# Patient Record
Sex: FEMALE | Race: ASIAN | NOT HISPANIC OR LATINO | Employment: PART TIME
[De-identification: names, ages, dates, MRNs, and addresses within clinical notes are randomized per-mention and may not be internally consistent; named-entity substitution may affect disease eponyms.]

---

## 2017-02-09 ENCOUNTER — RECORDS - HEALTHEAST (OUTPATIENT)
Dept: ADMINISTRATIVE | Facility: OTHER | Age: 42
End: 2017-02-09

## 2018-03-14 ENCOUNTER — COMMUNICATION - HEALTHEAST (OUTPATIENT)
Dept: TELEHEALTH | Facility: CLINIC | Age: 43
End: 2018-03-14

## 2018-03-14 ENCOUNTER — OFFICE VISIT - HEALTHEAST (OUTPATIENT)
Dept: FAMILY MEDICINE | Facility: CLINIC | Age: 43
End: 2018-03-14

## 2018-03-14 DIAGNOSIS — Z00.00 HEALTH CARE MAINTENANCE: ICD-10-CM

## 2018-03-14 DIAGNOSIS — R05.9 COUGH: ICD-10-CM

## 2018-03-14 LAB
ALBUMIN SERPL-MCNC: 3.3 G/DL (ref 3.5–5)
ALP SERPL-CCNC: 85 U/L (ref 45–120)
ALT SERPL W P-5'-P-CCNC: 11 U/L (ref 0–45)
ANION GAP SERPL CALCULATED.3IONS-SCNC: 8 MMOL/L (ref 5–18)
AST SERPL W P-5'-P-CCNC: 9 U/L (ref 0–40)
BILIRUB SERPL-MCNC: 0.3 MG/DL (ref 0–1)
BUN SERPL-MCNC: 10 MG/DL (ref 8–22)
CALCIUM SERPL-MCNC: 8.5 MG/DL (ref 8.5–10.5)
CHLORIDE BLD-SCNC: 107 MMOL/L (ref 98–107)
CHOLEST SERPL-MCNC: 182 MG/DL
CO2 SERPL-SCNC: 24 MMOL/L (ref 22–31)
CREAT SERPL-MCNC: 0.69 MG/DL (ref 0.6–1.1)
ERYTHROCYTE [DISTWIDTH] IN BLOOD BY AUTOMATED COUNT: 12.4 % (ref 11–14.5)
FASTING STATUS PATIENT QL REPORTED: YES
GFR SERPL CREATININE-BSD FRML MDRD: >60 ML/MIN/1.73M2
GLUCOSE BLD-MCNC: 94 MG/DL (ref 70–125)
HCT VFR BLD AUTO: 34.3 % (ref 35–47)
HDLC SERPL-MCNC: 49 MG/DL
HGB BLD-MCNC: 11.7 G/DL (ref 12–16)
LDLC SERPL CALC-MCNC: 122 MG/DL
MCH RBC QN AUTO: 28.6 PG (ref 27–34)
MCHC RBC AUTO-ENTMCNC: 34 G/DL (ref 32–36)
MCV RBC AUTO: 84 FL (ref 80–100)
PLATELET # BLD AUTO: 293 THOU/UL (ref 140–440)
PMV BLD AUTO: 8.7 FL (ref 7–10)
POTASSIUM BLD-SCNC: 4.4 MMOL/L (ref 3.5–5)
PROT SERPL-MCNC: 6.5 G/DL (ref 6–8)
RBC # BLD AUTO: 4.08 MILL/UL (ref 3.8–5.4)
SODIUM SERPL-SCNC: 139 MMOL/L (ref 136–145)
TRIGL SERPL-MCNC: 57 MG/DL
WBC: 4.7 THOU/UL (ref 4–11)

## 2018-03-14 ASSESSMENT — MIFFLIN-ST. JEOR: SCORE: 1288.64

## 2018-03-15 ENCOUNTER — HOSPITAL ENCOUNTER (OUTPATIENT)
Dept: MAMMOGRAPHY | Facility: CLINIC | Age: 43
Discharge: HOME | End: 2018-03-15

## 2018-03-15 DIAGNOSIS — Z00.00 HEALTH CARE MAINTENANCE: ICD-10-CM

## 2018-03-15 LAB — 25(OH)D3 SERPL-MCNC: 6.5 NG/ML (ref 30–80)

## 2018-03-16 ENCOUNTER — COMMUNICATION - HEALTHEAST (OUTPATIENT)
Dept: MAMMOGRAPHY | Facility: CLINIC | Age: 43
End: 2018-03-16

## 2018-03-20 ENCOUNTER — OFFICE VISIT - HEALTHEAST (OUTPATIENT)
Dept: FAMILY MEDICINE | Facility: CLINIC | Age: 43
End: 2018-03-20

## 2018-03-20 DIAGNOSIS — R09.82 PND (POST-NASAL DRIP): ICD-10-CM

## 2018-03-20 DIAGNOSIS — R05.3 PERSISTENT COUGH FOR 3 WEEKS OR LONGER: ICD-10-CM

## 2018-03-20 DIAGNOSIS — Z80.41 FAMILY HISTORY OF OVARIAN CANCER: ICD-10-CM

## 2018-04-09 ENCOUNTER — HOSPITAL ENCOUNTER (OUTPATIENT)
Dept: MAMMOGRAPHY | Facility: CLINIC | Age: 43
Discharge: HOME | End: 2018-04-09

## 2018-04-09 ENCOUNTER — HOSPITAL ENCOUNTER (OUTPATIENT)
Dept: ULTRASOUND IMAGING | Facility: CLINIC | Age: 43
Discharge: HOME | End: 2018-04-09

## 2018-04-09 DIAGNOSIS — N64.89 BREAST ASYMMETRY: ICD-10-CM

## 2018-04-17 ENCOUNTER — HOSPITAL ENCOUNTER (OUTPATIENT)
Dept: ULTRASOUND IMAGING | Facility: CLINIC | Age: 43
Discharge: HOME | End: 2018-04-17

## 2018-04-17 DIAGNOSIS — N64.89 BREAST ASYMMETRY: ICD-10-CM

## 2018-09-17 ENCOUNTER — OFFICE VISIT - HEALTHEAST (OUTPATIENT)
Dept: FAMILY MEDICINE | Facility: CLINIC | Age: 43
End: 2018-09-17

## 2018-09-17 DIAGNOSIS — B37.31 YEAST VAGINITIS: ICD-10-CM

## 2018-09-17 DIAGNOSIS — N89.8 VAGINAL DISCHARGE: ICD-10-CM

## 2018-09-17 DIAGNOSIS — N76.0 BACTERIAL VAGINOSIS: ICD-10-CM

## 2018-09-17 DIAGNOSIS — B96.89 BACTERIAL VAGINOSIS: ICD-10-CM

## 2018-09-17 LAB
CLUE CELLS: ABNORMAL
TRICHOMONAS, WET PREP: ABNORMAL
YEAST, WET PREP: ABNORMAL

## 2018-11-08 ENCOUNTER — OFFICE VISIT - HEALTHEAST (OUTPATIENT)
Dept: FAMILY MEDICINE | Facility: CLINIC | Age: 43
End: 2018-11-08

## 2018-11-08 ENCOUNTER — COMMUNICATION - HEALTHEAST (OUTPATIENT)
Dept: SCHEDULING | Facility: CLINIC | Age: 43
End: 2018-11-08

## 2018-11-08 DIAGNOSIS — B37.31 YEAST VAGINITIS: ICD-10-CM

## 2018-11-08 DIAGNOSIS — N89.8 VAGINAL DISCHARGE: ICD-10-CM

## 2018-11-08 LAB
CLUE CELLS: ABNORMAL
TRICHOMONAS, WET PREP: ABNORMAL
YEAST, WET PREP: ABNORMAL

## 2018-11-08 ASSESSMENT — MIFFLIN-ST. JEOR: SCORE: 1324.65

## 2019-04-19 ENCOUNTER — COMMUNICATION - HEALTHEAST (OUTPATIENT)
Dept: TELEHEALTH | Facility: CLINIC | Age: 44
End: 2019-04-19

## 2019-04-19 ENCOUNTER — HOSPITAL ENCOUNTER (OUTPATIENT)
Dept: MAMMOGRAPHY | Facility: CLINIC | Age: 44
Discharge: HOME | End: 2019-04-19
Attending: FAMILY MEDICINE

## 2019-04-19 ENCOUNTER — OFFICE VISIT - HEALTHEAST (OUTPATIENT)
Dept: FAMILY MEDICINE | Facility: CLINIC | Age: 44
End: 2019-04-19

## 2019-04-19 DIAGNOSIS — N89.8 VAGINAL DISCHARGE: ICD-10-CM

## 2019-04-19 DIAGNOSIS — Z12.4 SCREENING FOR MALIGNANT NEOPLASM OF CERVIX: ICD-10-CM

## 2019-04-19 DIAGNOSIS — Z12.31 VISIT FOR SCREENING MAMMOGRAM: ICD-10-CM

## 2019-04-19 LAB
CLUE CELLS: NORMAL
TRICHOMONAS, WET PREP: NORMAL
YEAST, WET PREP: NORMAL

## 2019-04-22 ENCOUNTER — COMMUNICATION - HEALTHEAST (OUTPATIENT)
Dept: FAMILY MEDICINE | Facility: CLINIC | Age: 44
End: 2019-04-22

## 2019-04-22 LAB
C TRACH DNA SPEC QL PROBE+SIG AMP: NEGATIVE
N GONORRHOEA DNA SPEC QL NAA+PROBE: NEGATIVE

## 2019-04-23 LAB
HPV SOURCE: NORMAL
HUMAN PAPILLOMA VIRUS 16 DNA: NEGATIVE
HUMAN PAPILLOMA VIRUS 18 DNA: NEGATIVE
HUMAN PAPILLOMA VIRUS FINAL DIAGNOSIS: NORMAL
HUMAN PAPILLOMA VIRUS OTHER HR: NEGATIVE
SPECIMEN DESCRIPTION: NORMAL

## 2019-04-26 LAB
BKR LAB AP ABNORMAL BLEEDING: NO
BKR LAB AP BIRTH CONTROL/HORMONES: NORMAL
BKR LAB AP CERVICAL APPEARANCE: NORMAL
BKR LAB AP GYN ADEQUACY: NORMAL
BKR LAB AP GYN INTERPRETATION: NORMAL
BKR LAB AP GYN OTHER FINDINGS: NORMAL
BKR LAB AP HPV REFLEX: NORMAL
BKR LAB AP LMP: NORMAL
BKR LAB AP PATIENT STATUS: NORMAL
BKR LAB AP PREVIOUS ABNORMAL: NORMAL
BKR LAB AP PREVIOUS NORMAL: NORMAL
HIGH RISK?: NO
PATH REPORT.COMMENTS IMP SPEC: NORMAL
RESULT FLAG (HE HISTORICAL CONVERSION): NORMAL

## 2019-04-28 ENCOUNTER — AMBULATORY - HEALTHEAST (OUTPATIENT)
Dept: FAMILY MEDICINE | Facility: CLINIC | Age: 44
End: 2019-04-28

## 2019-04-28 ENCOUNTER — COMMUNICATION - HEALTHEAST (OUTPATIENT)
Dept: FAMILY MEDICINE | Facility: CLINIC | Age: 44
End: 2019-04-28

## 2019-04-28 DIAGNOSIS — B37.31 YEAST VAGINITIS: ICD-10-CM

## 2019-05-20 ENCOUNTER — COMMUNICATION - HEALTHEAST (OUTPATIENT)
Dept: TELEHEALTH | Facility: CLINIC | Age: 44
End: 2019-05-20

## 2019-05-20 ENCOUNTER — OFFICE VISIT - HEALTHEAST (OUTPATIENT)
Dept: FAMILY MEDICINE | Facility: CLINIC | Age: 44
End: 2019-05-20

## 2019-05-20 DIAGNOSIS — L30.9 DERMATITIS: ICD-10-CM

## 2019-05-20 DIAGNOSIS — B37.31 YEAST VAGINITIS: ICD-10-CM

## 2019-05-29 ENCOUNTER — OFFICE VISIT - HEALTHEAST (OUTPATIENT)
Dept: FAMILY MEDICINE | Facility: CLINIC | Age: 44
End: 2019-05-29

## 2019-05-29 DIAGNOSIS — Z13.220 LIPID SCREENING: ICD-10-CM

## 2019-05-29 DIAGNOSIS — E55.9 VITAMIN D DEFICIENCY: ICD-10-CM

## 2019-05-29 DIAGNOSIS — Z00.00 ROUTINE GENERAL MEDICAL EXAMINATION AT A HEALTH CARE FACILITY: ICD-10-CM

## 2019-05-29 DIAGNOSIS — B37.31 RECURRENT CANDIDIASIS OF VAGINA: ICD-10-CM

## 2019-05-29 DIAGNOSIS — Z23 IMMUNIZATION DUE: ICD-10-CM

## 2019-05-29 DIAGNOSIS — L30.9 DERMATITIS: ICD-10-CM

## 2019-05-29 LAB
25(OH)D3 SERPL-MCNC: 5.3 NG/ML (ref 30–80)
25(OH)D3 SERPL-MCNC: 5.3 NG/ML (ref 30–80)
ALBUMIN SERPL-MCNC: 3.4 G/DL (ref 3.5–5)
ALP SERPL-CCNC: 85 U/L (ref 45–120)
ALT SERPL W P-5'-P-CCNC: 12 U/L (ref 0–45)
ANION GAP SERPL CALCULATED.3IONS-SCNC: 6 MMOL/L (ref 5–18)
AST SERPL W P-5'-P-CCNC: 9 U/L (ref 0–40)
BASOPHILS # BLD AUTO: 0 THOU/UL (ref 0–0.2)
BASOPHILS NFR BLD AUTO: 0 % (ref 0–2)
BILIRUB SERPL-MCNC: 0.3 MG/DL (ref 0–1)
BUN SERPL-MCNC: 9 MG/DL (ref 8–22)
CALCIUM SERPL-MCNC: 8.8 MG/DL (ref 8.5–10.5)
CHLORIDE BLD-SCNC: 109 MMOL/L (ref 98–107)
CHOLEST SERPL-MCNC: 181 MG/DL
CO2 SERPL-SCNC: 25 MMOL/L (ref 22–31)
CREAT SERPL-MCNC: 0.68 MG/DL (ref 0.6–1.1)
EOSINOPHIL # BLD AUTO: 0.2 THOU/UL (ref 0–0.4)
EOSINOPHIL NFR BLD AUTO: 4 % (ref 0–6)
ERYTHROCYTE [DISTWIDTH] IN BLOOD BY AUTOMATED COUNT: 13.4 % (ref 11–14.5)
ERYTHROCYTE [SEDIMENTATION RATE] IN BLOOD BY WESTERGREN METHOD: 25 MM/HR (ref 0–20)
FASTING STATUS PATIENT QL REPORTED: YES
GFR SERPL CREATININE-BSD FRML MDRD: >60 ML/MIN/1.73M2
GLUCOSE BLD-MCNC: 90 MG/DL (ref 70–125)
HBA1C MFR BLD: 5.5 % (ref 3.5–6)
HCT VFR BLD AUTO: 34.9 % (ref 35–47)
HDLC SERPL-MCNC: 56 MG/DL
HGB BLD-MCNC: 11.2 G/DL (ref 12–16)
LDLC SERPL CALC-MCNC: 112 MG/DL
LYMPHOCYTES # BLD AUTO: 1.4 THOU/UL (ref 0.8–4.4)
LYMPHOCYTES NFR BLD AUTO: 30 % (ref 20–40)
MCH RBC QN AUTO: 28.1 PG (ref 27–34)
MCHC RBC AUTO-ENTMCNC: 32.1 G/DL (ref 32–36)
MCV RBC AUTO: 88 FL (ref 80–100)
MONOCYTES # BLD AUTO: 0.3 THOU/UL (ref 0–0.9)
MONOCYTES NFR BLD AUTO: 6 % (ref 2–10)
NEUTROPHILS # BLD AUTO: 2.8 THOU/UL (ref 2–7.7)
NEUTROPHILS NFR BLD AUTO: 60 % (ref 50–70)
PLATELET # BLD AUTO: 197 THOU/UL (ref 140–440)
PMV BLD AUTO: 12.5 FL (ref 8.5–12.5)
POTASSIUM BLD-SCNC: 4.5 MMOL/L (ref 3.5–5)
PROT SERPL-MCNC: 6.3 G/DL (ref 6–8)
RBC # BLD AUTO: 3.99 MILL/UL (ref 3.8–5.4)
SODIUM SERPL-SCNC: 140 MMOL/L (ref 136–145)
TRIGL SERPL-MCNC: 65 MG/DL
TSH SERPL DL<=0.005 MIU/L-ACNC: 0.72 UIU/ML (ref 0.3–5)
WBC: 4.6 THOU/UL (ref 4–11)

## 2019-05-29 ASSESSMENT — MIFFLIN-ST. JEOR: SCORE: 1325.39

## 2019-07-01 ENCOUNTER — RECORDS - HEALTHEAST (OUTPATIENT)
Dept: ADMINISTRATIVE | Facility: OTHER | Age: 44
End: 2019-07-01

## 2019-07-26 ENCOUNTER — COMMUNICATION - HEALTHEAST (OUTPATIENT)
Dept: FAMILY MEDICINE | Facility: CLINIC | Age: 44
End: 2019-07-26

## 2019-07-26 DIAGNOSIS — B37.31 RECURRENT CANDIDIASIS OF VAGINA: ICD-10-CM

## 2019-08-09 ENCOUNTER — RECORDS - HEALTHEAST (OUTPATIENT)
Dept: ADMINISTRATIVE | Facility: OTHER | Age: 44
End: 2019-08-09

## 2019-08-09 LAB
CHLAMYDIA_EXT- HISTORICAL: NEGATIVE
SPECIMEN DESCRIPTION_EXT (HISTORICAL CONVERSION): NORMAL

## 2019-08-23 ENCOUNTER — RECORDS - HEALTHEAST (OUTPATIENT)
Dept: HEALTH INFORMATION MANAGEMENT | Facility: CLINIC | Age: 44
End: 2019-08-23

## 2019-09-03 ENCOUNTER — RECORDS - HEALTHEAST (OUTPATIENT)
Dept: ADMINISTRATIVE | Facility: OTHER | Age: 44
End: 2019-09-03

## 2019-12-30 ENCOUNTER — COMMUNICATION - HEALTHEAST (OUTPATIENT)
Dept: FAMILY MEDICINE | Facility: CLINIC | Age: 44
End: 2019-12-30

## 2020-01-06 ENCOUNTER — OFFICE VISIT - HEALTHEAST (OUTPATIENT)
Dept: FAMILY MEDICINE | Facility: CLINIC | Age: 45
End: 2020-01-06

## 2020-01-06 DIAGNOSIS — Z79.899 HIGH RISK MEDICATION USE: ICD-10-CM

## 2020-01-06 DIAGNOSIS — N76.0 RECURRENT VAGINITIS: ICD-10-CM

## 2020-01-06 DIAGNOSIS — N89.8 VAGINAL ODOR: ICD-10-CM

## 2020-01-06 DIAGNOSIS — B37.31 RECURRENT CANDIDIASIS OF VAGINA: ICD-10-CM

## 2020-01-06 LAB
CLUE CELLS: ABNORMAL
FASTING STATUS PATIENT QL REPORTED: NO
GLUCOSE BLD-MCNC: 84 MG/DL (ref 74–125)
TRICHOMONAS, WET PREP: ABNORMAL
YEAST, WET PREP: ABNORMAL

## 2020-01-07 LAB — ALT SERPL W P-5'-P-CCNC: 12 U/L (ref 0–45)

## 2020-02-18 ENCOUNTER — OFFICE VISIT - HEALTHEAST (OUTPATIENT)
Dept: FAMILY MEDICINE | Facility: CLINIC | Age: 45
End: 2020-02-18

## 2020-02-18 ENCOUNTER — COMMUNICATION - HEALTHEAST (OUTPATIENT)
Dept: TELEHEALTH | Facility: CLINIC | Age: 45
End: 2020-02-18

## 2020-02-18 DIAGNOSIS — N76.0 RECURRENT VAGINITIS: ICD-10-CM

## 2020-02-18 DIAGNOSIS — N94.6 DYSMENORRHEA: ICD-10-CM

## 2020-03-05 ENCOUNTER — COMMUNICATION - HEALTHEAST (OUTPATIENT)
Dept: SCHEDULING | Facility: CLINIC | Age: 45
End: 2020-03-05

## 2020-03-05 ENCOUNTER — OFFICE VISIT - HEALTHEAST (OUTPATIENT)
Dept: FAMILY MEDICINE | Facility: CLINIC | Age: 45
End: 2020-03-05

## 2020-03-05 DIAGNOSIS — J11.1 INFLUENZA-LIKE SYNDROME: ICD-10-CM

## 2020-08-05 ENCOUNTER — COMMUNICATION - HEALTHEAST (OUTPATIENT)
Dept: TELEHEALTH | Facility: CLINIC | Age: 45
End: 2020-08-05

## 2020-08-05 ENCOUNTER — OFFICE VISIT - HEALTHEAST (OUTPATIENT)
Dept: FAMILY MEDICINE | Facility: CLINIC | Age: 45
End: 2020-08-05

## 2020-08-05 DIAGNOSIS — Z13.1 SCREENING FOR DIABETES MELLITUS: ICD-10-CM

## 2020-08-05 DIAGNOSIS — Z00.00 ROUTINE GENERAL MEDICAL EXAMINATION AT A HEALTH CARE FACILITY: ICD-10-CM

## 2020-08-05 DIAGNOSIS — Z78.9 VEGETARIAN DIET: ICD-10-CM

## 2020-08-05 DIAGNOSIS — E55.9 VITAMIN D DEFICIENCY: ICD-10-CM

## 2020-08-05 DIAGNOSIS — Z13.220 LIPID SCREENING: ICD-10-CM

## 2020-08-05 DIAGNOSIS — E61.1 IRON DEFICIENCY: ICD-10-CM

## 2020-08-05 DIAGNOSIS — Z12.31 ENCOUNTER FOR SCREENING MAMMOGRAM FOR BREAST CANCER: ICD-10-CM

## 2020-08-05 LAB
ALBUMIN SERPL-MCNC: 3.7 G/DL (ref 3.5–5)
ALP SERPL-CCNC: 97 U/L (ref 45–120)
ALT SERPL W P-5'-P-CCNC: 17 U/L (ref 0–45)
ANION GAP SERPL CALCULATED.3IONS-SCNC: 7 MMOL/L (ref 5–18)
AST SERPL W P-5'-P-CCNC: 11 U/L (ref 0–40)
BILIRUB SERPL-MCNC: 0.6 MG/DL (ref 0–1)
BUN SERPL-MCNC: 10 MG/DL (ref 8–22)
CALCIUM SERPL-MCNC: 9.3 MG/DL (ref 8.5–10.5)
CHLORIDE BLD-SCNC: 106 MMOL/L (ref 98–107)
CHOLEST SERPL-MCNC: 176 MG/DL
CO2 SERPL-SCNC: 25 MMOL/L (ref 22–31)
CREAT SERPL-MCNC: 0.74 MG/DL (ref 0.6–1.1)
ERYTHROCYTE [DISTWIDTH] IN BLOOD BY AUTOMATED COUNT: 13.4 % (ref 11–14.5)
FASTING STATUS PATIENT QL REPORTED: YES
FERRITIN SERPL-MCNC: 14 NG/ML (ref 10–130)
GFR SERPL CREATININE-BSD FRML MDRD: >60 ML/MIN/1.73M2
GLUCOSE BLD-MCNC: 92 MG/DL (ref 70–125)
HCT VFR BLD AUTO: 35.6 % (ref 35–47)
HDLC SERPL-MCNC: 58 MG/DL
HGB BLD-MCNC: 12 G/DL (ref 12–16)
IRON SATN MFR SERPL: 38 % (ref 20–50)
IRON SERPL-MCNC: 149 UG/DL (ref 42–175)
LDLC SERPL CALC-MCNC: 105 MG/DL
MCH RBC QN AUTO: 28 PG (ref 27–34)
MCHC RBC AUTO-ENTMCNC: 33.8 G/DL (ref 32–36)
MCV RBC AUTO: 83 FL (ref 80–100)
PLATELET # BLD AUTO: 194 THOU/UL (ref 140–440)
PMV BLD AUTO: 9.3 FL (ref 7–10)
POTASSIUM BLD-SCNC: 4.8 MMOL/L (ref 3.5–5)
PROT SERPL-MCNC: 6.7 G/DL (ref 6–8)
RBC # BLD AUTO: 4.29 MILL/UL (ref 3.8–5.4)
SODIUM SERPL-SCNC: 138 MMOL/L (ref 136–145)
TIBC SERPL-MCNC: 390 UG/DL (ref 313–563)
TRANSFERRIN SERPL-MCNC: 312 MG/DL (ref 212–360)
TRIGL SERPL-MCNC: 66 MG/DL
VIT B12 SERPL-MCNC: 273 PG/ML (ref 213–816)
WBC: 5.3 THOU/UL (ref 4–11)

## 2020-08-05 ASSESSMENT — MIFFLIN-ST. JEOR: SCORE: 1314.05

## 2020-08-06 LAB
25(OH)D3 SERPL-MCNC: 23.5 NG/ML (ref 30–80)
25(OH)D3 SERPL-MCNC: 23.5 NG/ML (ref 30–80)

## 2020-08-13 ENCOUNTER — COMMUNICATION - HEALTHEAST (OUTPATIENT)
Dept: HEALTH INFORMATION MANAGEMENT | Facility: CLINIC | Age: 45
End: 2020-08-13

## 2020-10-08 ENCOUNTER — HOSPITAL ENCOUNTER (OUTPATIENT)
Dept: MAMMOGRAPHY | Facility: CLINIC | Age: 45
Discharge: HOME | End: 2020-10-08
Attending: FAMILY MEDICINE

## 2020-10-08 DIAGNOSIS — Z12.31 ENCOUNTER FOR SCREENING MAMMOGRAM FOR BREAST CANCER: ICD-10-CM

## 2021-05-28 NOTE — TELEPHONE ENCOUNTER
----- Message from Cynthia Whittington MD sent at 4/19/2019  6:00 PM CDT -----  Wet prep is negative for yeast or bacterial infection. Recommend ongoing symptomatic management for now. Pap smear will be back in another 2-3 wks.

## 2021-05-28 NOTE — TELEPHONE ENCOUNTER
Patient Returning Call  Reason for call:  Returning phone call  Information relayed to patient: Below message relayed to patient.  Patient has additional questions:  No  If YES, what are your questions/concerns:  No additional questions at this time.  Okay to leave a detailed message?: No call back needed

## 2021-05-28 NOTE — PROGRESS NOTES
OV   4/19/2019  Assessment:         1. Vaginal discharge  Wet Prep, Vaginal    Chlamydia trachomatis & Neisseria gonorrhoeae, Amplified Detection   2. Screening for malignant neoplasm of cervix  Gynecologic Cytology (PAP Smear)    HPV High Risk DNA Cervical           Plan:         We reviewed the potential etiologies for her vaginal symptoms and labs were sent as noted above. We discussed intermittent use of metronidazole and diflucan and other preventive measures. We did check a pap today. She will call or return to clinic with any ongoing or worsening symptoms.        Subjective:             Khadra Cummings is a 43 y.o. female who presents for evaluation of recurrent vaginal discharge, itching and burning. Symptoms have been present for several mos. Vaginal symptoms: intermittent odor. She does note that her  will get occasional burning symptoms as well. She denies lesions, pain and urinary symptoms of dysuria and urinary frequency. Associated symptoms: none.       Contraception: none. Sexually transmitted infection risk: very low risk of STD exposure. Menstrual flow: regular every 28-30 days. Last pap was in [place] 2-3 yrs ago. Would like to have       The following portions of the patient's history were reviewed and updated as appropriate: allergies, current medications and problem list    Review of Systems  A 12 point comprehensive review of systems was negative except as noted.       Objective:         Vitals:    04/19/19 0903   BP: 92/70   Pulse: 73   SpO2: 99%   Weight: 151 lb 6 oz (68.7 kg)   LMP: 04/06/2019      Physical Exam:  General: Alert, cooperative, no distress  Head: Normocephalic, without obvious abnormality, atraumatic  Eyes:  conjunctiva/corneas clear, EOM's intact  Throat: Lips, mucosa, and tongue normal; teeth and gums normal  Neck: Supple, symmetrical, trachea midline, no  Lungs: Clear to auscultation bilaterally, respirations unlabored  Heart: Regular rate and rhythm,  no murmur, rub,  or gallop,   Abdomen: Soft, non-tender, no masses, no organomegaly  Pelvic:  external genitalia normal, mild vaginal discharge, cervix normal in appearance,   no cervical motion tenderness  Extremities: Extremities normal, atraumatic, no cyanosis or edema  Skin: Skin color, texture, turgor normal, no rashes or lesions  Neurologic: Normal         Results for orders placed or performed in visit on 04/19/19   Wet Prep, Vaginal   Result Value Ref Range    Yeast Result No yeast seen No yeast seen    Trichomonas No Trichomonas seen No Trichomonas seen    Clue Cells, Wet Prep No Clue cells seen No Clue cells seen   Chlamydia trachomatis & Neisseria gonorrhoeae, Amplified Detection   Result Value Ref Range    Chlamydia trachomatis, Amplified Detection Negative Negative    Neisseria gonorrhoeae, Amplified Detection Negative Negative   Gynecologic Cytology (PAP Smear)   Result Value Ref Range    Case Report       Gynecologic Cytology Report                       Case: [id number]                                   Authorizing Provider:  Cynthia Whittington MD Collected:           04/19/2019 0941              Ordering Location:     [facility]       Received:            04/19/2019 0941                                     Family Medicine/OB                                                           First Screen:          Shea Pulido, CT                                                                               (ASCP)                                                                       Specimen:    SUREPATH PAP, SCREENING, Endocervical/cervical                                             Interpretation  Negative for squamous intraepithelial lesion or malignancy.      Negative for squamous intraepithelial lesion or malignancy    Result Flag Normal Normal    Other Findings       Fungal organisms morphologically consistent with Candida spp    Specimen Adequacy       Satisfactory for evaluation,  endocervical/transformation zone component present    HPV Reflex? Yes regardless of result     HIGH RISK No     LMP/Menopause Date 4/6/19     Abnormal Bleeding No     Pt Status n/a     Birth Control/Hormones None     Previous Normal/Date ? 2016     Prev Abn Date/Dx none     Cervical Appearance Normal    HPV High Risk DNA Cervical   Result Value Ref Range    HPV Source [facility]     HPV16 DNA Negative NEG    HPV18 DNA Negative NEG    Other HR HPV Negative NEG    Final Diagnosis SEE NOTES     Specimen Description Cervical Cells

## 2021-05-29 NOTE — PROGRESS NOTES
OV   5/20/2019  Assessment & Plan:      1. Dermatitis  clobetasol (TEMOVATE) 0.05 % cream   2. Yeast vaginitis  fluconazole (DIFLUCAN) 150 MG tablet         We reviewed the potential etiologies for her rash symptoms and we will cover with oral diflucan and topical clobetasol cream for the rash. We reviewed indications for re-evaluation and she will call or return to clinic with any additional problems or concerns.    Subjective:               Khadra Cummings is a 43 y.o. female who presents for evaluation of a rash/lesion involving the left lower leg. It started a few months ago, but she has had a similar rash on her right shin in the past. She denies any lesions on the scalp or hands or feet. Lesions are  flat, pink, and scaly. Rash has not changed over time. Rash is pruritic. Associated symptoms: none. Patient denies: arthralgia, decrease in energy level, fever and myalgia.        Patient has not had contacts with similar rash. Patient has not had new exposures (soaps, lotions, laundry detergents, foods, medications, plants, insects or animals). No known infectious exposures.       She also notes some recent vaginal itching and mild discharge.     The following portions of the patient's history were reviewed and updated as appropriate: allergies, current medications, past family history, past medical history, past social history, past surgical history and problem list.    Review of Systems  Pertinent items are noted in HPI.      Objective:      /64 (Patient Position: Sitting, Cuff Size: Adult Regular)   Pulse 72   Wt 150 lb 4 oz (68.2 kg)   SpO2 99%   BMI 25.59 kg/m           General:  alert, appears stated age and cooperative   Skin:  Erythema and scaling noted on anterior left lower leg. None noted on the right.

## 2021-05-29 NOTE — PROGRESS NOTES
Assessment:     1. Routine general medical examination at a health care facility    2. Recurrent candidiasis of vagina    3. Vitamin D deficiency    4. Dermatitis    5. Lipid screening    6. Immunization due        Plan:        1. Routine general medical examination at a health care facility  -Routine health maintenance discussion:  No smoking, limited alcohol (7 or less servings per week), 5 fruits/veg servings per day, 200 minutes of exercise per week.  Daily calcium/vitamin D guidelines, bone health, colon cancer screening beginning at age 50.  Accident avoidance, sun screen.   - Thyroid Cascade    2. Recurrent candidiasis of vagina  -Given her long history of recurrent symptoms now over the last several months, I am updating labs as listed below to look for obvious signs of diabetes or other infectious problems.  Additionally I am going to have her start a suppressive course of Diflucan as listed below for 6 months.  She will follow-up if this is not helping at least by my chart and we can consider referral to gynecology for further evaluation and assessment.  - fluconazole (DIFLUCAN) 150 MG tablet; Take 1 tablet (150 mg total) by mouth every third day for 9 days, THEN 1 tablet (150 mg total) once a week.  Dispense: 15 tablet; Refill: 1  - Comprehensive Metabolic Panel  - HM1(CBC and Differential)  - Erythrocyte Sedimentation Rate  - Glycosylated Hemoglobin A1c  - HM1 (CBC with Diff)    3. Vitamin D deficiency  -Updating labs today  - Vitamin D, Total (25-Hydroxy)    4. Dermatitis  -Given her long-standing history of dermatitis with her lack of response to the clobetasol cream that she is used previously with good results on her legs I am referring her back to dermatology to see if there are alternate options for treatment.  Seems eczematous to me with the patch in her back looks almost more psoriatic.  - Ambulatory referral to Dermatology    5. Lipid screening  - Lipid Cascade    6. Immunization due  - Tdap  vaccine,  6yo or older,  IM       Subjective:      Khadra Cummings is a 43 y.o. female who presents for an annual exam. The patient is sexually active. The patient participates in regular exercise: no. The patient reports that there is not domestic violence in her life.     She is generally doing well. She does continue to struggle with recurrent vaginal infections, burning, itching and urinary frequency. She does have this typically after her period, and at times before this.  She recently started her menstruation just a few days ago, and is now feeling really pretty well but typically after her menstruation is when she starts having symptoms again.    She does have recurrent dermatitis on her legs as well. She typically has this yearly in the spring with sun exposure on her back as well as her legs. She has seen dermatology in the past and they provided her with clobetasol cream which has always worked after one to two applications but this year it has not. She has no new exposures.     Healthy Habits:   Regular Exercise: No  Sunscreen Use: No  Healthy Diet: Yes  Dental Visits Regularly: No - Patient has not been to a dental clinic within the last two years do to a move. Hoping to get back into routine exams soon.   Seat Belt: Yes  Sexually active: Yes  Self Breast Exam Monthly:No  Hemoccults: N/A  Flex Sig: N/A  Colonoscopy: N/A  Lipid Profile: Yes  Glucose Screen: Yes  Prevention of Osteoporosis: No  Last Dexa: N/A  Guns at Home:  No      Immunization History   Administered Date(s) Administered     Tdap 2019     Varicella 2012     Immunization status: up to date and documented, missing doses of tdap.    No exam data present    Gynecologic History  Patient's last menstrual period was 2019 (exact date).  Contraception: none  Last Pap: 19. Results were: normal  Last mammogram: 19. Results were: normal      OB History    Para Term  AB Living   2 2 2         SAB TAB Ectopic  Multiple Live Births                  # Outcome Date GA Lbr Jhoan/2nd Weight Sex Delivery Anes PTL Lv   2 Term            1 Term                Current Outpatient Medications   Medication Sig Dispense Refill     clobetasol (TEMOVATE) 0.05 % cream Apply topically 2 (two) times a day. 60 g 1     fluconazole (DIFLUCAN) 150 MG tablet Take 1 tablet (150 mg total) by mouth every third day for 9 days, THEN 1 tablet (150 mg total) once a week. 15 tablet 1     No current facility-administered medications for this visit.      History reviewed. No pertinent past medical history.  History reviewed. No pertinent surgical history.  Patient has no known allergies.  Family History   Problem Relation Age of Onset     Heart disease Father      Diabetes Father      Hypertension Father      Hyperlipidemia Father      Diabetes Maternal Grandmother      Asthma Paternal Grandfather      Asthma Paternal Uncle      Social History     Socioeconomic History     Marital status:      Spouse name: Bi     Number of children: 2     Years of education: Not on file     Highest education level: Not on file   Occupational History     Employer: NOT EMPLOYED   Social Needs     Financial resource strain: Not on file     Food insecurity:     Worry: Not on file     Inability: Not on file     Transportation needs:     Medical: Not on file     Non-medical: Not on file   Tobacco Use     Smoking status: Never Smoker     Smokeless tobacco: Never Used   Substance and Sexual Activity     Alcohol use: No     Drug use: No     Sexual activity: Yes     Partners: Male     Birth control/protection: None   Lifestyle     Physical activity:     Days per week: Not on file     Minutes per session: Not on file     Stress: Not on file   Relationships     Social connections:     Talks on phone: Not on file     Gets together: Not on file     Attends Bahai service: Not on file     Active member of club or organization: Not on file     Attends meetings of clubs or  "organizations: Not on file     Relationship status: Not on file     Intimate partner violence:     Fear of current or ex partner: Not on file     Emotionally abused: Not on file     Physically abused: Not on file     Forced sexual activity: Not on file   Other Topics Concern     Not on file   Social History Narrative     Not on file       Review of Systems  Review of Systems      With the exception of the aforementioned issues, 12 point, comprehensive ROS was done and was negative.     Objective:         Vitals:    05/29/19 0828   BP: 98/60   Pulse: (!) 59   Temp: 98.5  F (36.9  C)   TempSrc: Oral   SpO2: 99%   Weight: 151 lb 1.6 oz (68.5 kg)   Height: 5' 4\" (1.626 m)     Body mass index is 25.94 kg/m .    Physical  Physical Exam     Gen: Well developed, well nourished, no acute distress.  HEENT: normocephalic/atraumatic, PERRLA/EOMI, TMs: Gray, normal light reflex, no nasal discharge.  Oral mucosa: no erythema/exudate  Neck: No LAD/masses/thyromegaly  Lungs: clear bilaterally  Heart: regular rate and rhythm, no murmurs/gallops/rubs  Breasts: symmetric, no masses/skin changes, nipple discharge, or axillary LAD.  BSE reviewed.  Abdomen: Normal bowel sounds, soft, non-tender, non-distended, no masses, neg Retana's/McBurney's, no rebound/guarding  Genital: deferred, not indicated and pap updated a few weeks ago  Lymphatics: no supraclavicular/axillary/epitrochlear/inguinal LAD. No edema.  Neuro: A&O x 3, CN II-XII intact, strength 5/5, reflexes symmetric, sensory intact to light touch.  Psych: Behavior appropriate, engaging.  Thought processes congruent, non-tangential.  Musculoskeletal: no gross deformities.  Skin: Thickened skin with erythema bilaterally over the lateral, small area of scaling and a patch on her left upper back     "

## 2021-05-31 VITALS — HEIGHT: 64 IN | BODY MASS INDEX: 24.27 KG/M2 | WEIGHT: 142.13 LBS

## 2021-06-01 VITALS — BODY MASS INDEX: 24.27 KG/M2 | WEIGHT: 142.5 LBS

## 2021-06-01 VITALS — BODY MASS INDEX: 25.89 KG/M2 | WEIGHT: 152 LBS

## 2021-06-02 VITALS — WEIGHT: 150.25 LBS | BODY MASS INDEX: 25.59 KG/M2

## 2021-06-02 VITALS — WEIGHT: 151.38 LBS | BODY MASS INDEX: 25.78 KG/M2

## 2021-06-02 VITALS — BODY MASS INDEX: 25.8 KG/M2 | HEIGHT: 64 IN | WEIGHT: 151.1 LBS

## 2021-06-02 VITALS — BODY MASS INDEX: 25.62 KG/M2 | HEIGHT: 64 IN | WEIGHT: 150.06 LBS

## 2021-06-03 VITALS
WEIGHT: 147.7 LBS | SYSTOLIC BLOOD PRESSURE: 100 MMHG | BODY MASS INDEX: 25.35 KG/M2 | OXYGEN SATURATION: 99 % | DIASTOLIC BLOOD PRESSURE: 70 MMHG | HEART RATE: 78 BPM

## 2021-06-04 VITALS
OXYGEN SATURATION: 98 % | BODY MASS INDEX: 25.37 KG/M2 | WEIGHT: 148.6 LBS | HEIGHT: 64 IN | DIASTOLIC BLOOD PRESSURE: 60 MMHG | HEART RATE: 66 BPM | SYSTOLIC BLOOD PRESSURE: 102 MMHG

## 2021-06-04 VITALS
SYSTOLIC BLOOD PRESSURE: 102 MMHG | BODY MASS INDEX: 25.2 KG/M2 | HEART RATE: 68 BPM | OXYGEN SATURATION: 99 % | DIASTOLIC BLOOD PRESSURE: 60 MMHG | WEIGHT: 146.8 LBS

## 2021-06-04 VITALS
TEMPERATURE: 98.3 F | OXYGEN SATURATION: 100 % | DIASTOLIC BLOOD PRESSURE: 66 MMHG | WEIGHT: 148 LBS | SYSTOLIC BLOOD PRESSURE: 100 MMHG | HEART RATE: 76 BPM | BODY MASS INDEX: 25.4 KG/M2

## 2021-06-05 NOTE — PROGRESS NOTES
Khadra,    Your ALT(liver test) and the glucose(screen for diabetes) both look normal.  Good News and hope we can get the yeast problem cleared up with the prolonged use of Diflucan. In 3 months please mary on your calendar to repeat the lab test for the liver.    Dr. Love

## 2021-06-05 NOTE — PROGRESS NOTES
ASSESSMENT/PLAN:       1. Vaginal odor    - Wet Prep, Vaginal, positive yeast    2.  Recurrent candidiasis of vagina    - fluconazole (DIFLUCAN) 150 MG tablet; Take 1 tablet (150 mg total) by mouth once for 1 dose. 1 tablet every 3 days times 3 and then weekly for 6 months  Dispense: 27 tablet; Refill: 0  - ALT (SGPT)  - Glucose  Trial of an extended course of Diflucan to take 1 tablet every third day x3 and then 1 tablet weekly for 6 months.  We will check an ALT now and repeat in 3 months.  I told her that I did not feel that taking probiotics in the form of yogurt is likely going to help but she certainly can take a probiotic if she wishes.  Avoid douching and preferably use showers rather than baths.  Also use a mild non-fragrance soap.  Avoid antibiotics unless absolutely necessary.  Recheck a random blood sugar today  Test results by my chart    3. High risk medication use    - ALT (SGPT); Future  If she continues to have recurrent symptoms may need to consider doing a culture on the yeast to see if it might be a resistant yeast infection to Diflucan.  She has used vaginal tablets or cream but not sure what what type of product was used.  She thinks it was something that was use just over-the-counter.  Of note is that her  does occasionally get a rash in the genital area but it typically is self-limited and does seem to be related to symptoms that occur shortly after intercourse.    The vulvar cyst on the left side is small and benign appearing and I do not feel is contributing to her symptoms.        Johny Love MD      PROGRESS NOTE   1/6/2020    SUBJECTIVE:  Khadra Cummings is a 44 y.o. female  who presents for   Chief Complaint   Patient presents with     Vaginal Itching     vaginal discharge, itching and burning and has been around for about a year      For the last 1 to 2 years the patient has had recurrent vaginitis.  The patient has been seen here in the clinic a number of times as well as at  [facility].  Wet preps have been positive for yeast as well as clue cells.  The patient has been treated with Diflucan and on one occasion took that every third day for 3 tablets and then once weekly for 5 or 6 weeks.  While being treated the symptoms were gone but shortly after that symptoms returned with vaginal discharge that is either clear or white clumpy discharge either way there is a strong odor that is offensive to her.  The patient has had testing for diabetes and also has had chlamydia testing and Pap smear testing which have been normal.   2 para 2  Cycles last 21-25 days  The last wet prep that we had checked was 2019 which was negative.  The patient has tried to eat yogurt on a daily basis as well as has taken probiotics.  She showers and does not take baths or whirlpools.  She does not douche.  She does have some hot flashes but her periods are still regular.  At times she has had some dysuria but typically that initial dysuria and also at those times would have some local itching and burning of the vaginal and vulvar area.  Occasionally will have urinary frequency but no urgency.  She has had urinary tract infections in the past.  In about a month she will be traveling and be away for about a month.    Patient Active Problem List   Diagnosis     Health care maintenance     Family history of ovarian cancer       Current Outpatient Medications   Medication Sig Dispense Refill     fluconazole (DIFLUCAN) 150 MG tablet Take 1 tablet (150 mg total) by mouth once for 1 dose. 1 tablet every 3 days times 3 and then weekly for 6 months 27 tablet 0     No current facility-administered medications for this visit.        Social History     Tobacco Use   Smoking Status Never Smoker   Smokeless Tobacco Never Used           OBJECTIVE:        Recent Results (from the past 240 hour(s))   Wet Prep, Vaginal   Result Value Ref Range    Yeast Result Yeast Seen (!) No yeast seen    Trichomonas No Trichomonas  seen No Trichomonas seen    Clue Cells, Wet Prep No Clue cells seen No Clue cells seen   Glucose   Result Value Ref Range    Glucose 84 74 - 125 mg/dL    Patient Fasting > 8hrs? No        Vitals:    01/06/20 1530   BP: 100/70   Pulse: 78   SpO2: 99%   Weight: 147 lb 11.2 oz (67 kg)     Weight: 147 lb 11.2 oz (67 kg)          Physical Exam:  GENERAL APPEARANCE: 44-year-old female very pleasant, NAD, well hydrated, well nourished  SKIN:  Normal skin turgor, no lesions/rashes   Genital exam reveals normal external genitalia.  She has a small vulvar cyst that measures about 4 mm on the left side.  There is a moderate amount of white cottage cheeselike discharge.  The vaginal mucosa seems slightly atrophic but cervix looks normal.  Wet prep was obtained and positive for yeast.  ABDOMEN: S&NT, no masses or enlarged organs   EXTREMITY: no edema and full ROM of all joints  NEURO: no focal findings

## 2021-06-06 NOTE — PROGRESS NOTES
Assessment:  1.  Influenza syndrome.    Plan: We discussed alternatives and I advised that she go ahead and take Tamiflu 75 mg-#10-1 p.o. twice daily, reviewed risks and benefits.  Push fluids use Tylenol or ibuprofen as needed.  Follow-up if she is not improving well.  She understands and agrees.  At this point they can just observe her other children do and only have them be seen if any difficulties.    Subjective: 44-year-old female presenting for evaluation of difficulties with cough runny nose muscle aching that is all developed during the last 24 hours.  Her  was diagnosed on Tuesday with influenza A by nasal swab and started on Tamiflu.  She is wondering whether she can continue to cook for the family and whether she is infectious etc.  They have 2 children.  She did not get the flu shot because in past years she has had symptoms of problems after getting the flu shot and did not want to have that happen.  Patient Active Problem List   Diagnosis     Health care maintenance     Family history of ovarian cancer     History reviewed. No pertinent past medical history.  No Known Allergies  Current Outpatient Medications on File Prior to Visit   Medication Sig Dispense Refill     fluconazole (DIFLUCAN) 150 MG tablet Take 1 tablet (150 mg total) by mouth once a week. 12 tablet 1     ibuprofen (ADVIL,MOTRIN) 600 MG tablet Take 1 tablet (600 mg total) by mouth every 6 (six) hours as needed for pain. 30 tablet 1     No current facility-administered medications on file prior to visit.      She does not smoke.  No alcohol.  All other review of systems are negative.    Objective:/66   Pulse 76   Temp 98.3  F (36.8  C) (Oral)   Wt 148 lb (67.1 kg)   LMP 02/11/2020 (Exact Date)   SpO2 100%   BMI 25.40 kg/m    Head normocephalic.  External ears and TMs normal.  There is nasal congestion.  There is mild erythema the pharynx but no exudate.  Neck supple without adenopathy or thyromegaly.  Lungs are  clear.  Heart regular rate and rhythm without murmur.  No pedal edema.  She does have intermittent coughing.

## 2021-06-06 NOTE — TELEPHONE ENCOUNTER
Call from pt         CC: Cough + chills + some fatigue         >  recently with flu A    > She would like to be screened     > No recent travel - no corona virus     > No fever     At home   Advil and APAP prn      Had some issues getting connected to scheduling to make appt         A/P:   > OK for appt at your request - I warm transferred over to sched as she was having issues getting connected with them      > Discussed usual cold / flu sx mgt - encouraged fluids - wash hands / cover cough until seen later this am      scarlett jackson rn              Reason for Disposition    Patient wants to be seen    Protocols used: INFLUENZA - SEASONAL-A-OH

## 2021-06-06 NOTE — PROGRESS NOTES
ASSESSMENT/PLAN:       1. Recurrent vaginitis  -Discussed with the patient that OBs next recommendation is trialing some topical vaginal estrogen. I recommended she try doing the Diflucan weekly for the next 2 to 3 months and if that is not going well she can let me know and I can send in a prescription for vaginal estrogen.  I then would be happy to refer her back to OB or have her follow-up here for recheck.  Greater than 25 minutes was spent with the patient more than 50% of this in discussing the management and plan of care for her recurrent vaginitis  - fluconazole (DIFLUCAN) 150 MG tablet; Take 1 tablet (150 mg total) by mouth once a week.  Dispense: 12 tablet; Refill: 1    2. Dysmenorrhea  -Increasing.  Pain, taking ibuprofen which does help, providing her with a prescription strength ibuprofen so that she is taking less pills at a time.  Discussed making sure she takes this with food.  - ibuprofen (ADVIL,MOTRIN) 600 MG tablet; Take 1 tablet (600 mg total) by mouth every 6 (six) hours as needed for pain.  Dispense: 30 tablet; Refill: 1      Return in about 3 months (around 5/18/2020) for Annual physical.      Verona Mcgregor MD      PROGRESS NOTE   2/18/2020    SUBJECTIVE:  Khadra Cummings is a 44 y.o. female  who presents for follow up.     She is feeling well at this time. She does wonder if she needs to take the fluconazole. She does have a vacation coming up. She does wonder if she should go to gynecology as well. She does not want to take the pill weekly.     She does note an odor as well. She does note that she can smell the odor at times. She will get a headache with use as well. She does have a friend who is a physician and he was wondering about menopause starting. She is frustrated with things. She does find that things are normal with the medication. She notes that her symptoms are good until about 2 weeks after her period. The last few periods she will have a lot of bleeding right away and  then will be done in 3-4 days. She does note that her periods are shortening, are now 5-7 days early. She does have itching and copious discharge.     She has not done well with birth control in the past.   Chief Complaint   Patient presents with     Vaginitis     Patient is here today to discuss recurrent vaginitis. Today, patient does not have any symptoms.          Patient Active Problem List   Diagnosis     Health care maintenance     Family history of ovarian cancer       Current Outpatient Medications   Medication Sig Dispense Refill     fluconazole (DIFLUCAN) 150 MG tablet Take 1 tablet (150 mg total) by mouth once a week. 12 tablet 1     ibuprofen (ADVIL,MOTRIN) 600 MG tablet Take 1 tablet (600 mg total) by mouth every 6 (six) hours as needed for pain. 30 tablet 1     No current facility-administered medications for this visit.        Social History     Tobacco Use   Smoking Status Never Smoker   Smokeless Tobacco Never Used           OBJECTIVE:        No results found for this or any previous visit (from the past 240 hour(s)).    Vitals:    02/18/20 1029   BP: 102/60   Patient Site: Left Arm   Patient Position: Sitting   Cuff Size: Adult Regular   Pulse: 68   SpO2: 99%   Weight: 146 lb 12.8 oz (66.6 kg)     Weight: 146 lb 12.8 oz (66.6 kg)          Physical Exam:  GENERAL APPEARANCE: A&A, NAD, well hydrated, well nourished  SKIN:  Normal skin turgor, no lesions/rashes   HEENT: moist mucous membranes, no rhinorrhea  Psych: Her affect is anxious, she is casually dressed and groomed, her thought process and speech pattern are normal  Vaginal exam deferred today  EXTREMITY: no edema   NEURO: no gross deficits

## 2021-06-06 NOTE — PATIENT INSTRUCTIONS - HE
Please plan on taking the fluconazole (yeast treatment ) medication for 3 more months. At that time stop. If your symptoms stay gone, then we've fixed the problem. If your symptoms come back, we'll restart the fluconazole for one to two weeks and then we should start the vaginal estrogen to see if this will help.     You can try Differin over the counter, a retinol type product for your face.

## 2021-06-09 ENCOUNTER — OFFICE VISIT - HEALTHEAST (OUTPATIENT)
Dept: FAMILY MEDICINE | Facility: CLINIC | Age: 46
End: 2021-06-09

## 2021-06-09 DIAGNOSIS — N92.6 IRREGULAR MENSTRUAL CYCLE: ICD-10-CM

## 2021-06-09 DIAGNOSIS — L30.9 DERMATITIS: ICD-10-CM

## 2021-06-09 DIAGNOSIS — E55.9 VITAMIN D DEFICIENCY: ICD-10-CM

## 2021-06-09 DIAGNOSIS — Z11.4 SCREENING FOR HIV WITHOUT PRESENCE OF RISK FACTORS: ICD-10-CM

## 2021-06-09 DIAGNOSIS — Z11.59 ENCOUNTER FOR HEPATITIS C SCREENING TEST FOR LOW RISK PATIENT: ICD-10-CM

## 2021-06-09 DIAGNOSIS — R23.2 HOT FLASHES: ICD-10-CM

## 2021-06-09 LAB
BASOPHILS # BLD AUTO: 0.1 THOU/UL (ref 0–0.2)
BASOPHILS NFR BLD AUTO: 1 % (ref 0–2)
EOSINOPHIL # BLD AUTO: 0.1 THOU/UL (ref 0–0.4)
EOSINOPHIL NFR BLD AUTO: 2 % (ref 0–6)
ERYTHROCYTE [DISTWIDTH] IN BLOOD BY AUTOMATED COUNT: 13 % (ref 11–14.5)
HCG UR QL: POSITIVE
HCT VFR BLD AUTO: 35.4 % (ref 35–47)
HGB BLD-MCNC: 11.7 G/DL (ref 12–16)
HIV 1+2 AB+HIV1 P24 AG SERPL QL IA: NEGATIVE
IMM GRANULOCYTES # BLD: 0 THOU/UL
IMM GRANULOCYTES NFR BLD: 0 %
LYMPHOCYTES # BLD AUTO: 1.9 THOU/UL (ref 0.8–4.4)
LYMPHOCYTES NFR BLD AUTO: 29 % (ref 20–40)
MCH RBC QN AUTO: 28.5 PG (ref 27–34)
MCHC RBC AUTO-ENTMCNC: 33.1 G/DL (ref 32–36)
MCV RBC AUTO: 86 FL (ref 80–100)
MONOCYTES # BLD AUTO: 0.4 THOU/UL (ref 0–0.9)
MONOCYTES NFR BLD AUTO: 6 % (ref 2–10)
NEUTROPHILS # BLD AUTO: 4.1 THOU/UL (ref 2–7.7)
NEUTROPHILS NFR BLD AUTO: 63 % (ref 50–70)
PLATELET # BLD AUTO: 212 THOU/UL (ref 140–440)
PMV BLD AUTO: 11.2 FL (ref 7–10)
RBC # BLD AUTO: 4.1 MILL/UL (ref 3.8–5.4)
TSH SERPL DL<=0.005 MIU/L-ACNC: 1.14 UIU/ML (ref 0.3–5)
WBC: 6.6 THOU/UL (ref 4–11)

## 2021-06-09 RX ORDER — CLOBETASOL PROPIONATE 0.5 MG/G
OINTMENT TOPICAL 2 TIMES DAILY
Status: SHIPPED | COMMUNITY
Start: 2021-06-09 | End: 2021-09-24

## 2021-06-09 RX ORDER — BETAMETHASONE DIPROPIONATE 0.5 MG/G
OINTMENT, AUGMENTED TOPICAL
Qty: 45 G | Refills: 1 | Status: SHIPPED | OUTPATIENT
Start: 2021-06-09 | End: 2022-10-07

## 2021-06-10 LAB
25(OH)D3 SERPL-MCNC: 11.6 NG/ML (ref 30–80)
HCV AB SERPL QL IA: NEGATIVE

## 2021-06-10 NOTE — PROGRESS NOTES
Assessment:     1. Routine general medical examination at a health care facility    2. Vitamin D deficiency    3. Vegetarian diet    4. Iron deficiency    5. Encounter for screening mammogram for breast cancer    6. Lipid screening    7. Screening for diabetes mellitus        Plan:      1. Routine general medical examination at a health care facility  -Routine health maintenance discussion:  No smoking, limited alcohol (7 or less servings per week), 5 fruits/veg servings per day, 200 minutes of exercise per week.  Daily calcium/vitamin D guidelines, bone health, colon cancer screening beginning at age 50.  Accident avoidance, sun screen.     2. Vitamin D deficiency  -will update levels today  - Vitamin D, Total (25-Hydroxy)    3. Vegetarian diet  -Is vegetarian with limited dairy, will update level today  - Vitamin B12    4. Iron deficiency  -has been iron deficient previously, will check today  - Ferritin  - Iron and Transferrin Iron Binding Capacity  - HM2(CBC w/o Differential)    5. Encounter for screening mammogram for breast cancer  - Mammo Screening Bilateral; Future    6. Lipid screening  - Lipid Lamar FASTING    7. Screening for diabetes mellitus  - Comprehensive Metabolic Panel       Subjective:      Khadra Cummings is a 45 y.o. female who presents for an annual exam. The patient is sexually active. The patient participates in regular exercise: no. The patient reports that there is not domestic violence in her life.     She is doing well now with the vaginitis. She has been doing better since her three month treatment with Diflucan.     She is a vegetarian, and does wonder about her vitamin levels.  Her mom and sister apparently have low vitamin D levels as well.    Healthy Habits:   Regular Exercise: No  Sunscreen Use: No  Healthy Diet: Yes  Dental Visits Regularly: Yes  Seat Belt: Yes  Sexually active: Yes  Self Breast Exam Monthly:Yes  Hemoccults: N/A  Flex Sig: N/A  Colonoscopy: N/A  Lipid Profile:  Yes  Glucose Screen: Yes  Prevention of Osteoporosis: No  Last Dexa: N/A  Guns at Home:  No      Immunization History   Administered Date(s) Administered     Tdap 2019     Varicella 2012     Immunization status: up to date and documented.    No exam data present    Gynecologic History  Patient's last menstrual period was 2020 (exact date).  Contraception: none  Last Pap: 2019. Results were: normal  Last mammogram: 2019. Results were: normal      OB History    Para Term  AB Living   2 2 2         SAB TAB Ectopic Multiple Live Births                  # Outcome Date GA Lbr Jhoan/2nd Weight Sex Delivery Anes PTL Lv   2 Term            1 Term                Current Outpatient Medications   Medication Sig Dispense Refill     ascorbic acid, vitamin C, (VITAMIN C) 500 mg TbER Take by mouth daily.       ergocalciferol, vitamin D2, (VITAMIN D2 ORAL) Take by mouth daily.       No current facility-administered medications for this visit.      History reviewed. No pertinent past medical history.  History reviewed. No pertinent surgical history.  Patient has no known allergies.  Family History   Problem Relation Age of Onset     Heart disease Father      Diabetes Father      Hypertension Father      Hyperlipidemia Father      Diabetes Maternal Grandmother      Asthma Paternal Grandfather      Asthma Paternal Uncle      Social History     Socioeconomic History     Marital status:      Spouse name: Bi     Number of children: 2     Years of education: Not on file     Highest education level: Not on file   Occupational History     Employer: NOT EMPLOYED   Social Needs     Financial resource strain: Not on file     Food insecurity     Worry: Not on file     Inability: Not on file     Transportation needs     Medical: Not on file     Non-medical: Not on file   Tobacco Use     Smoking status: Never Smoker     Smokeless tobacco: Never Used   Substance and Sexual Activity     Alcohol use:  "No     Drug use: No     Sexual activity: Yes     Partners: Male     Birth control/protection: None   Lifestyle     Physical activity     Days per week: Not on file     Minutes per session: Not on file     Stress: Not on file   Relationships     Social connections     Talks on phone: Not on file     Gets together: Not on file     Attends Cheondoism service: Not on file     Active member of club or organization: Not on file     Attends meetings of clubs or organizations: Not on file     Relationship status: Not on file     Intimate partner violence     Fear of current or ex partner: Not on file     Emotionally abused: Not on file     Physically abused: Not on file     Forced sexual activity: Not on file   Other Topics Concern     Not on file   Social History Narrative     Not on file       Review of Systems  Review of Systems      With the exception of the aforementioned issues, 12 point, comprehensive ROS was done and was negative.     Objective:         Vitals:    08/05/20 0951   BP: 102/60   Pulse: 66   SpO2: 98%   Weight: 148 lb 9.6 oz (67.4 kg)   Height: 5' 4\" (1.626 m)     Body mass index is 25.51 kg/m .    Physical  Physical Exam     Gen: Well developed, well nourished, no acute distress.  HEENT: normocephalic/atraumatic, PERRLA/EOMI, TMs: Gray, normal light reflex, no nasal discharge.  Oral mucosa: no erythema/exudate  Neck: No LAD/masses/thyromegaly  Lungs: clear bilaterally  Heart: regular rate and rhythm, no murmurs/gallops/rubs  Breasts: symmetric, no masses/skin changes, nipple discharge, or axillary LAD.  BSE reviewed.  Abdomen: Normal bowel sounds, soft, non-tender, non-distended, no masses, neg Retana's/McBurney's, no rebound/guarding  Genital: Deferred,no complaints  Lymphatics: no supraclavicular LAD. No edema.  Neuro: A&O x 3, CN II-XII intact, strength 5/5, reflexes symmetric, sensory intact to light touch.  Psych: Behavior appropriate, engaging.  Thought processes congruent, " non-tangential.  Musculoskeletal: no gross deformities.  Skin: no rashes or lesions.

## 2021-06-16 PROBLEM — Z80.41 FAMILY HISTORY OF OVARIAN CANCER: Status: ACTIVE | Noted: 2018-03-20

## 2021-06-16 PROBLEM — Z00.00 HEALTH CARE MAINTENANCE: Status: ACTIVE | Noted: 2018-03-14

## 2021-06-16 NOTE — PROGRESS NOTES
Assessment/plan   Khadra Cummings is a 42 y.o. female who is New  patient to my practice here with   Chief Complaint   Patient presents with     Establish Care     saw Claudia López once last week only      Cough     x2 weeks, worsening, dry throat, Codein cough syrup prescribed last week and that only helps at night; pt does have Hx of seasonal allergies, no Hx of asthma but runs of father's side of family         Khadra was seen today for establish care and cough.    Diagnoses and all orders for this visit:    Persistent cough for 3 weeks or longer    PND (post-nasal drip)    Family history of ovarian cancer  Comments:  basic edu done on ovarian cancer screen , info printed from [facility]     Other orders  -     fluticasone (FLONASE ALLERGY RELIEF) 50 mcg/actuation nasal spray; 2 spray each side of the nose at bed time , please also use nasal saline drops to prevent dryness  -     Cancel: loratadine (CLARITIN) 10 mg tablet; Take 1 tablet (10 mg total) by mouth daily.  -     predniSONE (DELTASONE) 20 MG tablet; Take 1 tablet (20 mg total) by mouth 2 (two) times a day for 5 days.  -     loratadine-pseudoephedrine (CLARITIN-D 12 HOUR) 5-120 mg Tb12; Take 1 tablet by mouth 2 (two) times a day.  -     azithromycin (ZITHROMAX) 250 MG tablet; Take 500 mg (2 x 250 mg tablets) on day 1 followed by 250 mg (1 tablet) on days 2-5.        Subjective:      HPI: Khadra Cummings is a 42 y.o. female is here for.    Cough: Patient complains of  nasal congestion and nonproductive cough.  Symptoms began 3 weeks ago.  The cough is non-productive, with wheezing, with shortness of breath, chest is painful during coughing and is aggravated by cold air, exercise and reclining position Associated symptoms include:change in voice and chest pain. Patient does not have new pets. Patient does not have a history of asthma. Patient does have a history of environmental allergens. Patient had recent travel from [place] . Patient does not have a history of smoking.  Patient  does not have previous Chest X-ray. Patient does not have had a PPD done.          No past medical history on file.  No past surgical history on file.  Review of patient's allergies indicates no known allergies.  Current Outpatient Prescriptions   Medication Sig Dispense Refill     codeine-guaiFENesin (GUAIFENESIN AC)  mg/5 mL liquid Take 5 mL by mouth at bedtime as needed for cough. 118 mL 0     azithromycin (ZITHROMAX) 250 MG tablet Take 500 mg (2 x 250 mg tablets) on day 1 followed by 250 mg (1 tablet) on days 2-5. 6 tablet 0     fluticasone (FLONASE ALLERGY RELIEF) 50 mcg/actuation nasal spray 2 spray each side of the nose at bed time , please also use nasal saline drops to prevent dryness 16 g 12     loratadine-pseudoephedrine (CLARITIN-D 12 HOUR) 5-120 mg Tb12 Take 1 tablet by mouth 2 (two) times a day. 30 tablet 0     predniSONE (DELTASONE) 20 MG tablet Take 1 tablet (20 mg total) by mouth 2 (two) times a day for 5 days. 10 tablet 0     No current facility-administered medications for this visit.      Family History   Problem Relation Age of Onset     Heart disease Father      Diabetes Father      Hypertension Father      Hyperlipidemia Father      Diabetes Maternal Grandmother      Asthma Paternal Grandfather        Patient Active Problem List   Diagnosis     Health care maintenance     Family history of ovarian cancer       Review of Systems   12 point comprehensive review of systems was negative except as noted and HPI     Social History     Social History Narrative       Objective:     Vitals:    03/20/18 1056   BP: 94/72   Pulse: 80   Temp: 98.7  F (37.1  C)   TempSrc: Oral   SpO2: 99%   Weight: 142 lb 8 oz (64.6 kg)       Physical Exam:     General: Alert, no acute distress.   HEENT: normocephalic conjunctivae are clear, Normal pearly TMs bilaterally without erythema, pus or fluid. Position and landmarks are normal.  Nose is clear.  Oropharynx is moist and clear, without tonsillar  hypertrophy, asymmetry, exudate or lesions.+Post nasal drip   Neck: supple without adenopathy or thyromegaly.  Lungs: Good aeration bilaterally. No prolongation of expiratory phase.   No tachypnea, retractions, or increased work of breathing. Mild occasional wheezes, Roxana or rhonci.    Heart: regular rate and rhythm, normal S1 and S2, no murmurs  Skin: clear without rash or lesions  Neuro: alert, interactive moving all extremities equally, normal muscle tone in all 4 extremities, deep tendon reflexes 2+ symmetrically at the patella      Janna Medrano MD    Patient Instructions   Dear Khadra,    It was a pleasure to see you in clinic today. Should you have any questions or concerns, my assistant is Kesha / and care coordinator Mandi and they  can be reached directly at [phone]    Plan discussed at this visit : how to help you cough  Chest  And sinus congestion      Cough and shortness of breath we recommend Claritin D ( or any other brand or generic) twice daily  with Prednisone 1 tab oral twice daily  For 5 days to help lung inflammation which most of the time caused by virus or allergies and does not require antibiotics treatment but if no symptom improvement in next 3-4 day please start antibiotics which prescription given today     Sinus congestion and post nasal drip : recommend Flonase( or any other brand or generic) 2 spray each side of the nose at bed time with saline rinse or drops to help clear the congestion and help drain the mucus out     Cough : other thing which can be tried Ricola cough drops Honey , estevan tea, vitamin C and saltwater gargle drink plenty of fluids to prevent dehydration and plenty of rest . Avoid smoke exposure .       If you are having any lab work or tests done, our office will contact you with those results in your preferred method of communication.    Feel free to call for any concerns or questions or send us My chart message     Janna Medrano MD

## 2021-06-19 ENCOUNTER — HEALTH MAINTENANCE LETTER (OUTPATIENT)
Age: 46
End: 2021-06-19

## 2021-06-19 NOTE — LETTER
Letter by Cynthia Whittington MD at      Author: Cynthia Whittington MD Service: -- Author Type: --    Filed:  Encounter Date: 4/28/2019 Status: (Other)         Khadra Cummings  [address] [facility] [address]               April 28, 2019      Dear Khadra:    The results of your most recent Pap smear are normal and HPV is negative. This means that no cancerous or precancerous cells were seen. We recommend that you come back in 3-5 years for your next routine Pap smear.    Please call with questions or contact us using [employer].    Sincerely,    Cynthia Whittington MD

## 2021-06-20 NOTE — PROGRESS NOTES
OV   9/17/2018  Assessment:         1. Yeast vaginitis     2. Bacterial vaginosis     3. Vaginal discharge  Wet Prep, Vaginal         Plan:         We reviewed the likely etiologies for her vaginal symptoms and labs were sent as noted above. Wet prep returned positive for yeast and bacterial infection. We will cover with diflucan and oral metronidazole as directed and we reviewed infection control measures, etc. I also sent refills and we discussed indications for re-treatment. She will call or return to clinic with any ongoing or worsening symptoms.        Subjective:             Khadra Cummings is a 43 y.o. female who presents for evaluation of an abnormal vaginal discharge, irritation and itching. Symptoms have been present for 4 days. Vaginal symptoms: discharge described as curd-like, local irritation and intermittent, mild odor. She denies abnormal bleeding and urinary symptoms of urinary frequency and urinary urgency. Associated symptoms: mild dysuria. Denies anorexia, constipation, diarrhea, fever and back or pelvic pain. Has been travelling recently, tries to avoid restrooms, so not drinking as much.       Contraception: none. Sexually transmitted infection risk: very low risk of STD exposure. Menstrual flow: regular every 28-30 days.    The following portions of the patient's history were reviewed and updated as appropriate: allergies, current medications and problem list         Review of Systems  Negative except as noted above      Objective:         Vitals:    09/17/18 0924   BP: 108/72   Pulse: 76   Temp: 97.4  F (36.3  C)   TempSrc: Oral   Weight: 152 lb (68.9 kg)   LMP: 09/04/2018      Physical Exam:  General: Alert, cooperative, no distress  Head: Normocephalic, without obvious abnormality, atraumatic  Eyes:  conjunctiva/corneas clear, EOM's intact  Throat: Lips, mucosa, and tongue normal; teeth and gums normal  Neck: Supple, symmetrical, trachea midline, no  Lungs: Clear to auscultation bilaterally,  respirations unlabored  Heart: Regular rate and rhythm,  no murmur, rub, or gallop,   Abdomen: Soft, non-tender, no masses, no organomegaly  Pelvic: normal eternal genitalia, mod curd-like vaginal discharge, normal cervix without CMT.   Extremities: Extremities normal, atraumatic, no cyanosis or edema  Skin: Skin color, texture, turgor normal, no rashes or lesions  Neurologic: Normal            Results for orders placed or performed in visit on 09/17/18   Wet Prep, Vaginal   Result Value Ref Range    Yeast Result Yeast Seen (!) No yeast seen    Trichomonas No Trichomonas seen No Trichomonas seen    Clue Cells, Wet Prep Clue cells seen (!) No Clue cells seen

## 2021-06-20 NOTE — LETTER
Letter by Marissa Pearson at      Author: Marissa Pearson Service: -- Author Type: --    Filed:  Encounter Date: 8/13/2020 Status: (Other)         August 13, 2020       Khadra Spenceel  [address] [facility] [address]    Dear Khadra Cummings:    We are pleased to provide you with secure, online access to medical information for you and your family within [facility]. Per your request, we have expanded your account to allow access to the records of the following family members:              Perry Cummings (privilege ends on 8/13/2025.)     How Do I Log In?  1. In your Internet browser, go to [email or web address]  2. Log into [facility] using your [facility] Username and Password.  3. Click Sign In.        How Do I Access a Family Member's Account?  4. Select the account you want to access by clicking the [place] with the appropriate patient's name at the top of your screen.   5. You will see a disclaimer page letting you know that you will be viewing a family member's record. Review the disclaimer and then click Accept Proxy Access Disclaimer to proceed.  6. Once you switch to viewing a family member's record, you can navigate to [facility] pages the same way you would for yourself. You can return to your own account by clicking the [place] at the top of the screen with your name on it.    7. To customize the colors and names of the linked accounts, you can select Personalize from the Profile dropdown menu at the top of the screen, then click the Edit button to make changes.     Additional Information  If you have questions, visit [email or web address], e-mail [email or web address] or call [phone] to talk to our [facility] staff. Remember, [facility] is NOT to be used for urgent needs. For medical emergencies, dial 911.

## 2021-06-21 NOTE — PROGRESS NOTES
"OV   11/8/2018  Assessment:         1. Yeast vaginitis     2. Vaginal discharge  Wet Prep, Vaginal         Plan:         We reviewed the potential etiologies for her vaginal symptoms and labs were sent as noted above. Wet prep is positive for yeast vaginitis and we will cover with oral diflucan as noted. I did send some refills and we discussed indications for re-treatment. We discussed ways to avoid recurrent yeast vaginitis, and she will follow up with persistent or recurrent symptoms.         Subjective:             Khadra Cummings is a 43 y.o. female who presents for evaluation of an abnormal vaginal discharge, irritation and itching. She was treated for similar symptoms about 6 wks ago with both diflucan and metronidazole orally and symptoms cleared up readily, but current symptoms have been present for 3-4 days. Vaginal symptoms: discharge described as curd-like. She denies abnormal bleeding, bumps, burning and urinary symptoms of dysuria and urinary frequency. Associated symptoms: none. Denies chills, diarrhea, fever and hematuria. Sexually transmitted infection risk: very low risk of STD exposure. Menstrual flow: regular every 28-30 days.    The following portions of the patient's history were reviewed and updated as appropriate: allergies, current medications and problem list    Review of Systems  12 point ROS negative except as noted above      Objective:         Vitals:    11/08/18 1355   BP: 106/72   Pulse: 80   Weight: 150 lb 1 oz (68.1 kg)   Height: 5' 4.25\" (1.632 m)      Physical Exam:  General: Alert, cooperative, no distress  Head: Normocephalic, without obvious abnormality, atraumatic  Eyes:  conjunctiva/corneas clear, EOM's intact  Throat: Lips, mucosa, and tongue normal; teeth and gums normal  Neck: Supple, symmetrical, trachea midline, no  Lungs: Clear to auscultation bilaterally, respirations unlabored  Heart: Regular rate and rhythm,  no murmur, rub, or gallop,   Abdomen: Soft, non-tender, no " masses, no organomegaly  Pelvic:Perineum: is normal  Vulva: normal  Vagina: curdlike discharge  Cervix: no cervical motion tenderness  Extremities: Extremities normal, atraumatic, no cyanosis or edema  Skin: Skin color, texture, turgor normal, no rashes or lesions  Neurologic: Normal            Results for orders placed or performed in visit on 11/08/18   Wet Prep, Vaginal   Result Value Ref Range    Yeast Result Yeast Seen (!) No yeast seen    Trichomonas No Trichomonas seen No Trichomonas seen    Clue Cells, Wet Prep No Clue cells seen No Clue cells seen

## 2021-06-26 NOTE — PROGRESS NOTES
"    Assessment & Plan     Dermatitis  -It is like some sort of eczematous process, has done well with betamethasone previously, will renew today.  If not improving with this she will let me know and I can refer to dermatology.  - betamethasone, augmented, (DIPROLENE) 0.05 % ointment  Dispense: 45 g; Refill: 1    Hot flashes  -Discussed possibility of perimenopause, checking labs as listed below and I did originally order an LH and FSH however once her pregnancy test came back positive I discontinued these.  Likely related to her pregnancy, if not improving she will let me know.  - Thyroid Cascade  - HM1 (CBC and Differential)    Irregular menstrual cycle  -Pregnancy test is positive today, this was quite shocking to the patient.  She will consider her options and follow-up here if choosing to proceed.  - Pregnancy, Urine    Vitamin D deficiency  -Has been vitamin D deficient previously, will update labs today.  - Vitamin D, Total (25-Hydroxy)    Encounter for hepatitis C screening test for low risk patient  - Hepatitis C Antibody (Anti-HCV)    Screening for HIV without presence of risk factors  - HIV Antigen/Antibody Screening [facility]           BMI:   Estimated body mass index is 25.4 kg/m  as calculated from the following:    Height as of 8/5/20: 5' 4\" (1.626 m).    Weight as of this encounter: 148 lb (67.1 kg).   The following high BMI interventions were performed this visit: weight monitoring    Return in about 3 months (around 9/9/2021) for Annual physical.    Verona Mcgregor MD  [facility]   Khadra Cummings is 45 y.o. and presents today for the following health issues   HPI     She has had a rash for the last few days. She does note that it is itchy at times, she does have it off and on. She does have a rash on the right anterior shin that is there. She has been using some old cream. She does typically use the medication only for 2 days and then it's gone. She " does note that this time it's been 3 days and spreading.     She does note that she is having hot flashes at times. She does wonder if this is normal for her. Her LMP was 4/30/21, typically she is a 21 day cycle. She does have these at night mainly. She does have poor sex drive as well.     She does not have any other concerns.       Review of Systems    Per above      Objective    BP 98/64   Pulse 78   Wt 148 lb (67.1 kg)   LMP 04/30/2021 (Approximate)   SpO2 100%   Breastfeeding No   BMI 25.40 kg/m    Body mass index is 25.4 kg/m .  Physical Exam    GENERAL APPEARANCE: A&A, NAD, well hydrated, well nourished  SKIN:  Normal skin turgor, scattered eczematous areas on lower extremity and back  HEENT: moist mucous membranes, no rhinorrhea  NECK: No LAD  CV: RRR, no M/G/R   LUNGS: CTAB  ABDOMEN: S&NT, no masses   EXTREMITY: no edema   NEURO: no gross deficits   PSYCH: normal affect

## 2021-06-26 NOTE — PATIENT INSTRUCTIONS - HE
Please call [facility] to talk about getting an appointment:   [place] [address]  [phone]

## 2021-07-06 VITALS
BODY MASS INDEX: 25.4 KG/M2 | DIASTOLIC BLOOD PRESSURE: 64 MMHG | HEART RATE: 78 BPM | SYSTOLIC BLOOD PRESSURE: 98 MMHG | OXYGEN SATURATION: 100 % | WEIGHT: 148 LBS

## 2021-09-06 ENCOUNTER — TELEPHONE (OUTPATIENT)
Dept: FAMILY MEDICINE | Facility: CLINIC | Age: 46
End: 2021-09-06

## 2021-09-06 DIAGNOSIS — Z13.220 LIPID SCREENING: ICD-10-CM

## 2021-09-06 DIAGNOSIS — E55.9 VITAMIN D DEFICIENCY: Primary | ICD-10-CM

## 2021-09-06 DIAGNOSIS — Z78.9 VEGETARIAN DIET: ICD-10-CM

## 2021-09-06 DIAGNOSIS — E61.1 IRON DEFICIENCY: ICD-10-CM

## 2021-09-06 DIAGNOSIS — Z13.1 SCREENING FOR DIABETES MELLITUS: ICD-10-CM

## 2021-09-06 NOTE — TELEPHONE ENCOUNTER
Reason for call:  Other   Patient called regarding (reason for call): Lab orders for annual physical  Additional comments: Pt has lab appointment scheduled 9/16, would like to get labs done prior to annual physical, please put in orders for the labs    Phone number to reach patient:  Home number on file [phone] (home)    Best Time: n/a not necessary to call back    Can we leave a detailed message on this number?  Not Applicable    Travel screening: Not Applicable

## 2021-09-16 ENCOUNTER — LAB (OUTPATIENT)
Dept: LAB | Facility: CLINIC | Age: 46
End: 2021-09-16
Payer: COMMERCIAL

## 2021-09-16 DIAGNOSIS — Z13.1 SCREENING FOR DIABETES MELLITUS: ICD-10-CM

## 2021-09-16 DIAGNOSIS — Z78.9 VEGETARIAN DIET: ICD-10-CM

## 2021-09-16 DIAGNOSIS — R53.83 OTHER FATIGUE: Primary | ICD-10-CM

## 2021-09-16 DIAGNOSIS — Z13.220 LIPID SCREENING: ICD-10-CM

## 2021-09-16 DIAGNOSIS — E61.1 IRON DEFICIENCY: ICD-10-CM

## 2021-09-16 DIAGNOSIS — E55.9 VITAMIN D DEFICIENCY: ICD-10-CM

## 2021-09-16 LAB
CHOLEST SERPL-MCNC: 202 MG/DL
FASTING STATUS PATIENT QL REPORTED: YES
FASTING STATUS PATIENT QL REPORTED: YES
GLUCOSE BLD-MCNC: 98 MG/DL (ref 70–125)
HDLC SERPL-MCNC: 63 MG/DL
HGB BLD-MCNC: 10.8 G/DL (ref 11.7–15.7)
LDLC SERPL CALC-MCNC: 127 MG/DL
TRIGL SERPL-MCNC: 60 MG/DL
VIT B12 SERPL-MCNC: 200 PG/ML (ref 213–816)

## 2021-09-16 PROCEDURE — 80061 LIPID PANEL: CPT

## 2021-09-16 PROCEDURE — 85018 HEMOGLOBIN: CPT

## 2021-09-16 PROCEDURE — 36415 COLL VENOUS BLD VENIPUNCTURE: CPT

## 2021-09-16 PROCEDURE — 82947 ASSAY GLUCOSE BLOOD QUANT: CPT

## 2021-09-16 PROCEDURE — 82306 VITAMIN D 25 HYDROXY: CPT

## 2021-09-16 PROCEDURE — 82607 VITAMIN B-12: CPT

## 2021-09-17 LAB — DEPRECATED CALCIDIOL+CALCIFEROL SERPL-MC: 17 UG/L (ref 30–80)

## 2021-09-24 ENCOUNTER — OFFICE VISIT (OUTPATIENT)
Dept: FAMILY MEDICINE | Facility: CLINIC | Age: 46
End: 2021-09-24
Payer: COMMERCIAL

## 2021-09-24 VITALS
SYSTOLIC BLOOD PRESSURE: 100 MMHG | WEIGHT: 148 LBS | HEIGHT: 64 IN | DIASTOLIC BLOOD PRESSURE: 74 MMHG | BODY MASS INDEX: 25.27 KG/M2 | HEART RATE: 70 BPM | OXYGEN SATURATION: 98 %

## 2021-09-24 DIAGNOSIS — E61.1 IRON DEFICIENCY: ICD-10-CM

## 2021-09-24 DIAGNOSIS — E53.8 VITAMIN B12 DEFICIENCY (NON ANEMIC): ICD-10-CM

## 2021-09-24 DIAGNOSIS — E55.9 VITAMIN D DEFICIENCY: ICD-10-CM

## 2021-09-24 DIAGNOSIS — R45.4 IRRITABILITY: ICD-10-CM

## 2021-09-24 DIAGNOSIS — Z00.00 ROUTINE GENERAL MEDICAL EXAMINATION AT A HEALTH CARE FACILITY: Primary | ICD-10-CM

## 2021-09-24 DIAGNOSIS — Z12.31 ENCOUNTER FOR SCREENING MAMMOGRAM FOR BREAST CANCER: ICD-10-CM

## 2021-09-24 PROCEDURE — 99396 PREV VISIT EST AGE 40-64: CPT | Performed by: FAMILY MEDICINE

## 2021-09-24 ASSESSMENT — MIFFLIN-ST. JEOR: SCORE: 1306.32

## 2021-09-24 NOTE — PATIENT INSTRUCTIONS
Please start taking your vitamin D again, I'd recommend that you take 25-50 mcg of D3 daily.     You should also start taking B12 daily, a sublingual vitamin would be best given the family history of B12. 3000 mcg would be just fine.     Your hemoglobin is low as well, this will likely improve some with the low B12. Focus on eating greens (Spinach, broccoli) and this will help as well increase your iron.     Preventive Health Recommendations  Female Ages 40 to 49    Yearly exam:     See your health care provider every year in order to  1. Review health changes.   2. Discuss preventive care.    3. Review your medicines if your doctor prescribed any.      Get a Pap test every three years (unless you have an abnormal result and your provider advises testing more often).      If you get Pap tests with HPV test, you only need to test every 5 years, unless you have an abnormal result. You do not need a Pap test if your uterus was removed (hysterectomy) and you have not had cancer.      You should be tested each year for STDs (sexually transmitted diseases), if you're at risk.     Ask your doctor if you should have a mammogram.      Have a colonoscopy (test for colon cancer) if someone in your family has had colon cancer or polyps before age 50.       Have a cholesterol test every 5 years.       Have a diabetes test (fasting glucose) after age 45. If you are at risk for diabetes, you should have this test every 3 years.    Shots: Get a flu shot each year. Get a tetanus shot every 10 years.     Nutrition:     Eat at least 5 servings of fruits and vegetables each day.    Eat whole-grain bread, whole-wheat pasta and brown rice instead of white grains and rice.    Get adequate Calcium and Vitamin D.      Lifestyle    Exercise at least 150 minutes a week (an average of 30 minutes a day, 5 days a week). This will help you control your weight and prevent disease.    Limit alcohol to one drink per day.    No smoking.     Wear  sunscreen to prevent skin cancer.    See your dentist every six months for an exam and cleaning.

## 2021-09-24 NOTE — PROGRESS NOTES
SUBJECTIVE:   CC: Khadra Cummings is an 46 year old woman who presents for preventive health visit.       Patient has been advised of split billing requirements and indicates understanding: Yes  Healthy Habits:     Getting at least 3 servings of Calcium per day:  NO    Bi-annual eye exam:  NO    Dental care twice a year:  NO    Sleep apnea or symptoms of sleep apnea:  Sleep apnea    Diet:  Regular (no restrictions)    Frequency of exercise:  None    Taking medications regularly:  No    Medication side effects:  None    PHQ-2 Total Score: 0    Additional concerns today:  No    She is generally doing well. At times she does note that she is more sensitive at times. She does at times note that she will get emotionally upset and will break down. She will get more stubborn as well. She is not working either right now.        Today's PHQ-2 Score:   PHQ-2 ( 1999 Pfizer) 9/24/2021   Q1: Little interest or pleasure in doing things 0   Q2: Feeling down, depressed or hopeless 0   PHQ-2 Score 0   Q1: Little interest or pleasure in doing things Not at all   Q2: Feeling down, depressed or hopeless Not at all   PHQ-2 Score 0       Abuse: Current or Past (Physical, Sexual or Emotional) - No  Do you feel safe in your environment? Yes    Have you ever done Advance Care Planning? (For example, a Health Directive, POLST, or a discussion with a medical provider or your loved ones about your wishes): No, advance care planning information given to patient to review.  Advanced care planning was discussed at today's visit.    Social History     Tobacco Use     Smoking status: Never Smoker     Smokeless tobacco: Never Used   Substance Use Topics     Alcohol use: No     If you drink alcohol do you typically have >3 drinks per day or >7 drinks per week? No    Alcohol Use 9/24/2021   Prescreen: >3 drinks/day or >7 drinks/week? No   Prescreen: >3 drinks/day or >7 drinks/week? -         Breast Cancer Screening:  Any new diagnosis of family breast,  "ovarian, or bowel cancer? No    FHS-7: No flowsheet data found.    Mammogram Screening - Offered annual screening and updated Health Maintenance for mutual plan based on risk factor consideration    Pertinent mammograms are reviewed under the imaging tab.    History of abnormal Pap smear: NO - age 30-65 PAP every 5 years with negative HPV co-testing recommended  PAP / HPV Latest Ref Rng & Units 4/19/2019   PAP Negative for squamous intraepithelial lesion or malignancy. Negative for squamous intraepithelial lesion or malignancy  Electronically signed by Shea Pulido CT (ASCP) on 4/26/2019 at 12:51 PM     HPV16 NEG Negative   HPV18 NEG Negative   HRHPV NEG Negative     Reviewed and updated as needed this visit by clinical staff  Tobacco  Allergies  Meds  Problems  Med Hx  Surg Hx  Fam Hx          Reviewed and updated as needed this visit by Provider  Tobacco  Allergies  Meds  Problems  Med Hx  Surg Hx  Fam Hx           Review of Systems  With the exception of the aforementioned issues, 12 point, comprehensive ROS was done and was negative.      OBJECTIVE:   /74   Pulse 70   Ht 1.626 m (5' 4\")   Wt 67.1 kg (148 lb)   LMP 09/12/2021 (Exact Date)   SpO2 98%   BMI 25.40 kg/m    Physical Exam  GENERAL: healthy, alert and no distress  EYES: Eyes grossly normal to inspection, PERRL and conjunctivae and sclerae normal  HENT: ear canals and TM's normal, nose and mouth without ulcers or lesions  NECK: no adenopathy, no asymmetry, masses, or scars and thyroid normal to palpation  RESP: lungs clear to auscultation - no rales, rhonchi or wheezes  BREAST: normal without masses, tenderness or nipple discharge and no palpable axillary masses or adenopathy  CV: regular rate and rhythm, normal S1 S2, no S3 or S4, no murmur, click or rub, no peripheral edema and peripheral pulses strong  ABDOMEN: soft, nontender, no hepatosplenomegaly, no masses and bowel sounds normal   (female): deferred  MS: no gross " "musculoskeletal defects noted, no edema  SKIN: no suspicious lesions or rashes  NEURO: Normal strength and tone, mentation intact and speech normal  PSYCH: mentation appears normal, affect normal/bright    Diagnostic Test Results:  Labs reviewed in Epic    ASSESSMENT/PLAN:   Khadra was seen today for physical.    Diagnoses and all orders for this visit:    Routine general medical examination at a health care facility   Routine health maintenance discussion:  No smoking, limited alcohol (7 or less servings per week), 5 fruits/veg servings per day, 200 minutes of exercise per week.  Daily calcium/vitamin D guidelines, bone health, colon cancer screening beginning at age 50.  Accident avoidance, sun screen.   See below for education discussed  Encounter for screening mammogram for breast cancer  -     MA Screen Bilateral w/Neil; Future   Has been doing yearly mammograms, order placed today    Irritability   Will work on stress management, if not improving we could certainly trial low dose ssri    Has been vitamin D deficient, b12 and iron deficient, will f/u in 3 months for labs as listed below after replacement  Vitamin D deficiency  -     Vitamin D deficiency screening; Future    Iron deficiency  -     CBC with platelets; Future  -     Iron binding panel; Future  -     Ferritin; Future    Vitamin B12 deficiency (non anemic)  -     Vitamin B12; Future    Other orders  -     REVIEW OF HEALTH MAINTENANCE PROTOCOL ORDERS      Patient has been advised of split billing requirements and indicates understanding: Yes  COUNSELING:  Reviewed preventive health counseling, as reflected in patient instructions       Regular exercise       Healthy diet/nutrition       Contraception       Family planning       Osteoporosis prevention/bone health       Advance Care Planning    Estimated body mass index is 25.4 kg/m  as calculated from the following:    Height as of this encounter: 1.626 m (5' 4\").    Weight as of this encounter: 67.1 kg " (148 lb).    Weight management plan: Discussed healthy diet and exercise guidelines    She reports that she has never smoked. She has never used smokeless tobacco.      Counseling Resources:  ATP IV Guidelines  Pooled Cohorts Equation Calculator  Breast Cancer Risk Calculator  BRCA-Related Cancer Risk Assessment: FHS-7 Tool  FRAX Risk Assessment  ICSI Preventive Guidelines  Dietary Guidelines for Americans, 2010  USDA's MyPlate  ASA Prophylaxis  Lung CA Screening    Verona Mcgregor MD  [facility]

## 2021-11-23 ENCOUNTER — ANCILLARY PROCEDURE (OUTPATIENT)
Dept: MAMMOGRAPHY | Facility: CLINIC | Age: 46
End: 2021-11-23
Attending: FAMILY MEDICINE
Payer: COMMERCIAL

## 2021-11-23 DIAGNOSIS — Z12.31 ENCOUNTER FOR SCREENING MAMMOGRAM FOR BREAST CANCER: ICD-10-CM

## 2021-11-23 PROCEDURE — 77063 BREAST TOMOSYNTHESIS BI: CPT

## 2022-09-18 ENCOUNTER — HEALTH MAINTENANCE LETTER (OUTPATIENT)
Age: 47
End: 2022-09-18

## 2022-09-21 ENCOUNTER — LAB (OUTPATIENT)
Dept: LAB | Facility: CLINIC | Age: 47
End: 2022-09-21
Payer: COMMERCIAL

## 2022-09-21 DIAGNOSIS — E61.1 IRON DEFICIENCY: ICD-10-CM

## 2022-09-21 DIAGNOSIS — E53.8 VITAMIN B12 DEFICIENCY (NON ANEMIC): ICD-10-CM

## 2022-09-21 DIAGNOSIS — E55.9 VITAMIN D DEFICIENCY: ICD-10-CM

## 2022-09-21 DIAGNOSIS — Z00.00 HEALTH CARE MAINTENANCE: Primary | ICD-10-CM

## 2022-09-21 LAB
DEPRECATED CALCIDIOL+CALCIFEROL SERPL-MC: 14 UG/L (ref 20–75)
ERYTHROCYTE [DISTWIDTH] IN BLOOD BY AUTOMATED COUNT: 13 % (ref 10–15)
FERRITIN SERPL-MCNC: 17 NG/ML (ref 6–175)
HCT VFR BLD AUTO: 33.3 % (ref 35–47)
HGB BLD-MCNC: 10.8 G/DL (ref 11.7–15.7)
IRON BINDING CAPACITY (ROCHE): 341 UG/DL (ref 240–430)
IRON SATN MFR SERPL: 24 % (ref 15–46)
IRON SERPL-MCNC: 82 UG/DL (ref 37–145)
MCH RBC QN AUTO: 27.9 PG (ref 26.5–33)
MCHC RBC AUTO-ENTMCNC: 32.4 G/DL (ref 31.5–36.5)
MCV RBC AUTO: 86 FL (ref 78–100)
PLATELET # BLD AUTO: 235 10E3/UL (ref 150–450)
RBC # BLD AUTO: 3.87 10E6/UL (ref 3.8–5.2)
VIT B12 SERPL-MCNC: 376 PG/ML (ref 232–1245)
WBC # BLD AUTO: 4.6 10E3/UL (ref 4–11)

## 2022-09-21 PROCEDURE — 83550 IRON BINDING TEST: CPT

## 2022-09-21 PROCEDURE — 82306 VITAMIN D 25 HYDROXY: CPT

## 2022-09-21 PROCEDURE — 36415 COLL VENOUS BLD VENIPUNCTURE: CPT

## 2022-09-21 PROCEDURE — 82728 ASSAY OF FERRITIN: CPT

## 2022-09-21 PROCEDURE — 83540 ASSAY OF IRON: CPT

## 2022-09-21 PROCEDURE — 82607 VITAMIN B-12: CPT

## 2022-09-21 PROCEDURE — 85027 COMPLETE CBC AUTOMATED: CPT

## 2022-10-07 ENCOUNTER — OFFICE VISIT (OUTPATIENT)
Dept: FAMILY MEDICINE | Facility: CLINIC | Age: 47
End: 2022-10-07
Payer: COMMERCIAL

## 2022-10-07 VITALS
SYSTOLIC BLOOD PRESSURE: 98 MMHG | WEIGHT: 151 LBS | HEIGHT: 64 IN | HEART RATE: 65 BPM | BODY MASS INDEX: 25.78 KG/M2 | OXYGEN SATURATION: 98 % | RESPIRATION RATE: 16 BRPM | DIASTOLIC BLOOD PRESSURE: 62 MMHG | TEMPERATURE: 97.9 F

## 2022-10-07 DIAGNOSIS — L30.9 DERMATITIS: ICD-10-CM

## 2022-10-07 DIAGNOSIS — N92.6 IRREGULAR MENSTRUAL CYCLE: ICD-10-CM

## 2022-10-07 DIAGNOSIS — Z71.84 TRAVEL ADVICE ENCOUNTER: ICD-10-CM

## 2022-10-07 DIAGNOSIS — E55.9 VITAMIN D DEFICIENCY: ICD-10-CM

## 2022-10-07 DIAGNOSIS — Z00.00 ENCOUNTER FOR ROUTINE HISTORY AND PHYSICAL EXAMINATION OF ADULT: Primary | ICD-10-CM

## 2022-10-07 DIAGNOSIS — M79.643 PAIN OF HAND, UNSPECIFIED LATERALITY: ICD-10-CM

## 2022-10-07 DIAGNOSIS — Z23 IMMUNIZATION DUE: ICD-10-CM

## 2022-10-07 DIAGNOSIS — Z12.31 ENCOUNTER FOR SCREENING MAMMOGRAM FOR BREAST CANCER: ICD-10-CM

## 2022-10-07 DIAGNOSIS — Z12.11 SCREEN FOR COLON CANCER: ICD-10-CM

## 2022-10-07 DIAGNOSIS — D64.9 ANEMIA, UNSPECIFIED TYPE: ICD-10-CM

## 2022-10-07 LAB — TSH SERPL DL<=0.005 MIU/L-ACNC: 1.63 UIU/ML (ref 0.3–4.2)

## 2022-10-07 PROCEDURE — 99396 PREV VISIT EST AGE 40-64: CPT | Mod: 25 | Performed by: FAMILY MEDICINE

## 2022-10-07 PROCEDURE — 84443 ASSAY THYROID STIM HORMONE: CPT | Performed by: FAMILY MEDICINE

## 2022-10-07 PROCEDURE — 83021 HEMOGLOBIN CHROMOTOGRAPHY: CPT | Performed by: FAMILY MEDICINE

## 2022-10-07 PROCEDURE — 90471 IMMUNIZATION ADMIN: CPT | Performed by: FAMILY MEDICINE

## 2022-10-07 PROCEDURE — 83020 HEMOGLOBIN ELECTROPHORESIS: CPT | Performed by: FAMILY MEDICINE

## 2022-10-07 PROCEDURE — 90686 IIV4 VACC NO PRSV 0.5 ML IM: CPT | Performed by: FAMILY MEDICINE

## 2022-10-07 PROCEDURE — 99213 OFFICE O/P EST LOW 20 MIN: CPT | Mod: 25 | Performed by: FAMILY MEDICINE

## 2022-10-07 PROCEDURE — 36415 COLL VENOUS BLD VENIPUNCTURE: CPT | Performed by: FAMILY MEDICINE

## 2022-10-07 RX ORDER — ERGOCALCIFEROL 1.25 MG/1
50000 CAPSULE, LIQUID FILLED ORAL WEEKLY
Qty: 12 CAPSULE | Refills: 3 | Status: SHIPPED | OUTPATIENT
Start: 2022-10-07 | End: 2023-10-23

## 2022-10-07 RX ORDER — BETAMETHASONE DIPROPIONATE 0.5 MG/G
OINTMENT, AUGMENTED TOPICAL
Qty: 45 G | Refills: 1 | Status: SHIPPED | OUTPATIENT
Start: 2022-10-07 | End: 2023-11-01

## 2022-10-07 ASSESSMENT — ENCOUNTER SYMPTOMS
FREQUENCY: 0
FEVER: 0
WEAKNESS: 1
EYE PAIN: 0
CONSTIPATION: 0
NERVOUS/ANXIOUS: 0
HEMATURIA: 0
SHORTNESS OF BREATH: 0
HEADACHES: 0
JOINT SWELLING: 0
HEMATOCHEZIA: 0
ABDOMINAL PAIN: 0
PALPITATIONS: 0
ARTHRALGIAS: 0
HEARTBURN: 0
NAUSEA: 0
COUGH: 0
MYALGIAS: 0
SORE THROAT: 0
DIARRHEA: 0
DYSURIA: 0
PARESTHESIAS: 0
CHILLS: 0
DIZZINESS: 0

## 2022-10-07 NOTE — PROGRESS NOTES
SUBJECTIVE:   CC: Khadra is an 47 year old who presents for preventive health visit.     Healthy Habits:     Getting at least 3 servings of Calcium per day:  NO    Bi-annual eye exam:  Yes    Dental care twice a year:  NO    Sleep apnea or symptoms of sleep apnea:  Excessive snoring and Sleep apnea    Diet:  Regular (no restrictions)    Frequency of exercise:  None    Taking medications regularly:  Yes    Medication side effects:  None and Other    PHQ-2 Total Score: 1    Additional concerns today:  No    Her biggest issue today is that she is feeling quite tired and has a lack of interest in doing things. She has been quite busy as well and wondering if that is contributing to her feelings. She feels lazy as well. She does note that her hair is falling out and she does have dry skin.     Her mother has always been anemic as well. Her periods are heavier than normal as well in the this last year. She notes that before her period comes, last month she was 10-11 days early, this year, it is normal again. She has always been on the earlier side. She has to change her pad 2-3 times a day typically and this year with her son's graduation she did have leaking from her pad which is uncommon for her.     She does note that she has been snoring as well. She will wake up gasping for air as well.     She does have some right thumb pain. 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 She does feel weaker as well in her thumb. Massage does help with her pain.     She is going to [place] in January and does wonder if she needs to have any shots.     Today's PHQ-2 Score:   PHQ-2 ( 1999 Pfizer) 10/7/2022   Q1: Little interest or pleasure in doing things 1   Q2: Feeling down, depressed or hopeless 0   PHQ-2 Score 1   PHQ-2 Total Score (12-17 Years)- Positive if 3 or more points; Administer PHQ-A if positive -   Q1: Little interest or pleasure in doing things Not at all   Q2:  Feeling down, depressed or hopeless Not at all   PHQ-2 Score 0       Abuse: Current or Past (Physical, Sexual or Emotional) - No  Do you feel safe in your environment? Yes    Social History     Tobacco Use     Smoking status: Never     Smokeless tobacco: Never   Substance Use Topics     Alcohol use: No       Alcohol Use 10/7/2022   Prescreen: >3 drinks/day or >7 drinks/week? No   Prescreen: >3 drinks/day or >7 drinks/week? -     Reviewed orders with patient.  Reviewed health maintenance and updated orders accordingly - Yes      Breast Cancer Screening:  Any new diagnosis of family breast, ovarian, or bowel cancer? No    FHS-7:   Breast CA Risk Assessment (FHS-7) 11/23/2021   Did any of your first-degree relatives have breast or ovarian cancer? No   Did any of your relatives have bilateral breast cancer? No   Did any man in your family have breast cancer? No   Did any woman in your family have breast and ovarian cancer? No   Did any woman in your family have breast cancer before age 50 y? No   Do you have 2 or more relatives with breast and/or ovarian cancer? No   Do you have 2 or more relatives with breast and/or bowel cancer? No       Mammogram Screening: Recommended annual mammography  Pertinent mammograms are reviewed under the imaging tab.    History of abnormal Pap smear: NO - age 30-65 PAP every 5 years with negative HPV co-testing recommended  PAP / HPV Latest Ref Rng & Units 4/19/2019   PAP Negative for squamous intraepithelial lesion or malignancy. Negative for squamous intraepithelial lesion or malignancy  Electronically signed by Shea Pulido CT (ASCP) on 4/26/2019 at 12:51 PM     HPV16 NEG Negative   HPV18 NEG Negative   HRHPV NEG Negative     Reviewed and updated as needed this visit by clinical staff   Tobacco  Allergies  Meds  Problems  Med Hx  Surg Hx  Fam Hx  Soc   Hx        Reviewed and updated as needed this visit by Provider   Tobacco  Allergies  Meds  Problems  Med Hx  Surg Hx  " Fam Hx             Review of Systems   Constitutional: Negative for chills and fever.   HENT: Negative for congestion, ear pain, hearing loss and sore throat.    Eyes: Negative for pain and visual disturbance.   Respiratory: Negative for cough and shortness of breath.    Cardiovascular: Negative for chest pain, palpitations and peripheral edema.   Gastrointestinal: Negative for abdominal pain, constipation, diarrhea, heartburn, hematochezia and nausea.   Genitourinary: Negative for dysuria, frequency, genital sores, hematuria and urgency.   Musculoskeletal: Negative for arthralgias, joint swelling and myalgias.   Skin: Positive for rash.   Neurological: Positive for weakness. Negative for dizziness, headaches and paresthesias.   Psychiatric/Behavioral: Positive for mood changes. The patient is not nervous/anxious.         OBJECTIVE:   BP 98/62   Pulse 65   Temp 97.9  F (36.6  C)   Resp 16   Ht 1.626 m (5' 4\")   Wt 68.5 kg (151 lb)   LMP 10/06/2022   SpO2 98%   BMI 25.92 kg/m    Physical Exam  GENERAL: healthy, alert and no distress  EYES: Eyes grossly normal to inspection, PERRL and conjunctivae and sclerae normal  HENT: ear canals and TM's normal, nose and mouth without ulcers or lesions  NECK: no adenopathy, no asymmetry, masses, or scars and thyroid normal to palpation  RESP: lungs clear to auscultation - no rales, rhonchi or wheezes  BREAST: normal without masses, tenderness or nipple discharge and no palpable axillary masses or adenopathy  CV: regular rate and rhythm, normal S1 S2, no S3 or S4, no murmur, click or rub, no peripheral edema and peripheral pulses strong  ABDOMEN: soft, nontender, no hepatosplenomegaly, no masses and bowel sounds normal   (female): deferred  MS: no gross musculoskeletal defects noted, no edema, hand without obvious abnormality.   SKIN: no suspicious lesions or rashes  NEURO: Normal strength and tone, mentation intact and speech normal  PSYCH: mentation appears normal, " affect normal/bright        ASSESSMENT/PLAN:   Khadra was seen today for physical.    Diagnoses and all orders for this visit:    Encounter for routine history and physical examination of adult   Routine health maintenance discussion:  No smoking, limited alcohol (7 or less servings per week), 5 fruits/veg servings per day, 200 minutes of exercise per week.  Daily calcium/vitamin D guidelines, bone health, colon cancer screening beginning at age 50.  Accident avoidance, sun screen.    Dermatitis  -     augmented betamethasone dipropionate (DIPROLENE-AF) 0.05 % external ointment; Apply topically two times daily as needed for rash, no more than 7 days per treatment   Renewal for as needed use.     Anemia, unspecified type  -     Hemoglobinopathy/Thalassemia Cascade; Future  -     Hemoglobinopathy/Thalassemia Cascade   Chronically anemic without iron deficiency, will test for thalassemia, given ethnicity. Given her chronic fatigue, if unrevealing could consider referral to hematology. Would highly recommend sleep medicine referral as well.     Irregular menstrual cycle  -     TSH with free T4 reflex; Future  -     TSH with free T4 reflex    Travel advice encounter  -     typhoid (VIVOTIF) CR capsule; Take 1 capsule by mouth every other day for 4 doses One capsule on alternate days (day 1, 3, 5, and 7) for a total of 4 doses; all doses should be complete at least 1 week prior to potential exposure.   Discussed could consider malaria ppx, especially when visiting her sister, will consider and let me know if she would like this, would send in Malarone. Is agreeable to the typhoid vaccine today.     Pain of hand, unspecified laterality   Likely arthritis, will defer xray today as pain in intermittent and she has had no trauma. Will reach out if worsening and can then consider xray and referral to OT/PT    Vitamin D deficiency  -     vitamin D2 (ERGOCALCIFEROL) 18699 units (1250 mcg) capsule; Take 1 capsule (50,000 Units) by  "mouth once a week    Screen for colon cancer  -     Colonoscopy Screening  Referral; Future    Immunization due  -     INFLUENZA VACCINE IM > 6 MONTHS VALENT IIV4 (AFLURIA/FLUZONE)    Encounter for screening mammogram for breast cancer  -     MA Screen Bilateral w/Neil; Future    Other orders  -     REVIEW OF HEALTH MAINTENANCE PROTOCOL ORDERS      Patient has been advised of split billing requirements and indicates understanding: Yes    COUNSELING:  Reviewed preventive health counseling, as reflected in patient instructions       Regular exercise       Healthy diet/nutrition       Colorectal Cancer Screening       Advance Care Planning    Estimated body mass index is 25.92 kg/m  as calculated from the following:    Height as of this encounter: 1.626 m (5' 4\").    Weight as of this encounter: 68.5 kg (151 lb).    Weight management plan: Discussed healthy diet and exercise guidelines    She reports that she has never smoked. She has never used smokeless tobacco.      Counseling Resources:  ATP IV Guidelines  Pooled Cohorts Equation Calculator  Breast Cancer Risk Calculator  BRCA-Related Cancer Risk Assessment: FHS-7 Tool  FRAX Risk Assessment  ICSI Preventive Guidelines  Dietary Guidelines for Americans, 2010  USDA's MyPlate  ASA Prophylaxis  Lung CA Screening    Verona Mcgregor MD  [facility]  "

## 2022-10-12 LAB
HEMOGLOBIN A2 QUANTITATION: 2.4 % (ref 2.2–3.5)
HEMOGLOBIN ELECTROPHRESIS: NORMAL
HEMOGLOBIN F QUANTITATION: <0.5 % (ref 0–2)
REVIEWING PATHOLOGIST: NORMAL

## 2022-12-27 ENCOUNTER — TELEPHONE (OUTPATIENT)
Dept: FAMILY MEDICINE | Facility: CLINIC | Age: 47
End: 2022-12-27

## 2022-12-27 DIAGNOSIS — Z23 IMMUNIZATION DUE: Primary | ICD-10-CM

## 2022-12-28 ENCOUNTER — HOSPITAL ENCOUNTER (OUTPATIENT)
Dept: MAMMOGRAPHY | Facility: CLINIC | Age: 47
Discharge: HOME | End: 2022-12-28
Attending: FAMILY MEDICINE | Admitting: FAMILY MEDICINE
Payer: COMMERCIAL

## 2022-12-28 ENCOUNTER — ALLIED HEALTH/NURSE VISIT (OUTPATIENT)
Dept: FAMILY MEDICINE | Facility: CLINIC | Age: 47
End: 2022-12-28
Payer: COMMERCIAL

## 2022-12-28 DIAGNOSIS — Z23 IMMUNIZATION DUE: ICD-10-CM

## 2022-12-28 DIAGNOSIS — Z12.31 ENCOUNTER FOR SCREENING MAMMOGRAM FOR BREAST CANCER: ICD-10-CM

## 2022-12-28 PROCEDURE — 90471 IMMUNIZATION ADMIN: CPT

## 2022-12-28 PROCEDURE — 90746 HEPB VACCINE 3 DOSE ADULT IM: CPT

## 2022-12-28 PROCEDURE — 77067 SCR MAMMO BI INCL CAD: CPT

## 2022-12-28 PROCEDURE — 99207 PR NO CHARGE NURSE ONLY: CPT

## 2023-02-28 ENCOUNTER — NURSE TRIAGE (OUTPATIENT)
Dept: NURSING | Facility: CLINIC | Age: 48
End: 2023-02-28
Payer: COMMERCIAL

## 2023-02-28 DIAGNOSIS — R05.9 COUGH, UNSPECIFIED TYPE: Primary | ICD-10-CM

## 2023-02-28 RX ORDER — BENZONATATE 200 MG/1
200 CAPSULE ORAL 3 TIMES DAILY PRN
Qty: 30 CAPSULE | Refills: 1 | Status: SHIPPED | OUTPATIENT
Start: 2023-02-28 | End: 2023-11-01

## 2023-02-28 NOTE — TELEPHONE ENCOUNTER
Nurse Triage SBAR    Is this a 2nd Level Triage? YES, LICENSED PRACTITIONER REVIEW IS REQUIRED    Situation: Patient reports cough x 1.5 weeks.      Background: First noted with cold symptoms.  Mucous is clear to yellowish.  Cough is worse at night and keeping her awake.  She reports increased mucous membrane dryness.  Reports exhaustion from constant coughing.  Stated she has taking multiple OTC meds with poor effect.    Assessment: chronic cough with impact on ADLs.  Higher level of care indicated per protocols.    Protocol Recommended Disposition:   See in Office Today or Tomorrow, See More Appropriate Protocol    Recommendation: routing to clinic for further assessment.  Patient requesting medications to reduce coughing spells.     Routed to provider    Does the patient meet one of the following criteria for ADS visit consideration? 16+ years old, with an [facility] PCP     TIP  Providers, please consider if this condition is appropriate for management at one of our Acute and Diagnostic Services sites.     If patient is a good candidate, please use dotphrase <dot>triageresponse and select Refer to ADS to document. Cough X 1.5 weeks.   Clear mucuos.    Reason for Disposition    Cough and < 3 weeks duration    Continuous (nonstop) coughing interferes with work or school and no improvement using cough treatment per Care Advice    Additional Information    Negative: SEVERE difficulty breathing (e.g., struggling for each breath, speaks in single words)    Negative: Lips or face are bluish now and persists when not coughing    Negative: Sounds like a life-threatening emergency to the triager    Negative: Chest pain is main symptom    Negative: Previous asthma attacks and this feels like an asthma attack    Negative: Bluish (or gray) lips or face    Negative: SEVERE difficulty breathing (e.g., struggling for each breath, speaks in single words)    Negative: Rapid onset of cough and has hives    Negative: Coughing started  suddenly after medicine, an allergic food or bee sting    Negative: Difficulty breathing after exposure to flames, smoke, or fumes    Negative: Sounds like a life-threatening emergency to the triager    Negative: Previous asthma attacks and this feels like asthma attack    Negative: Dry cough (non-productive; no sputum or minimal clear sputum) and within 14 days of COVID-19 Exposure    Negative: SEVERE coughing spells (e.g., whooping sound after coughing, vomiting after coughing)    Negative: Coughing up ary-colored (reddish-brown) or blood-tinged sputum    Negative: Fever present > 3 days (72 hours)    Negative: Fever returns after gone for over 24 hours and symptoms worse or not improved    Negative: Using nasal washes and pain medicine > 24 hours and sinus pain persists    Negative: Known COPD or other severe lung disease (i.e., bronchiectasis, cystic fibrosis, lung surgery) and worsening symptoms (i.e., increased sputum purulence or amount, increased breathing difficulty)    Negative: MODERATE difficulty breathing (e.g., speaks in phrases, SOB even at rest, pulse 100-120) and still present when not coughing    Negative: Chest pain present when not coughing    Negative: Passed out (i.e., fainted, collapsed and was not responding)    Negative: Patient sounds very sick or weak to the triager    Negative: MILD difficulty breathing (e.g., minimal/no SOB at rest, SOB with walking, pulse <100) and still present when not coughing    Negative: Coughed up > 1 tablespoon (15 ml) blood (Exception: Blood-tinged sputum.)    Negative: Fever > 103 F (39.4 C)    Negative: Fever > 101 F (38.3 C) and over 60 years of age    Negative: Fever > 100.0 F (37.8 C) and has diabetes mellitus or a weak immune system (e.g., HIV positive, cancer chemotherapy, organ transplant, splenectomy, chronic steroids)    Negative: Fever > 100.0 F (37.8 C) and bedridden (e.g., nursing home patient, stroke, chronic illness, recovering from surgery)     Negative: Increasing ankle swelling    Negative: Wheezing is present    Protocols used: COUGH - CHRONIC-A-OH, COUGH-A-OH    Chris El RN, BSN, MSN  [facility] Triage 1:50 PM

## 2023-02-28 NOTE — TELEPHONE ENCOUNTER
Spoke with patient and relayed message. She understands and is appreciative.     Sharri Gunn, CMA

## 2023-02-28 NOTE — TELEPHONE ENCOUNTER
Prescription for a cough suppressant that she can take sent to the pharmacy.     In addition to this, coughs can last for a few weeks after a respiratory illness, so if she is otherwise feeling well she can probably wait a few days before she comes in otherwise, she certainly could be seen somewhere to have her lungs listened to

## 2023-03-08 ENCOUNTER — ANCILLARY PROCEDURE (OUTPATIENT)
Dept: GENERAL RADIOLOGY | Facility: CLINIC | Age: 48
End: 2023-03-08
Attending: FAMILY MEDICINE
Payer: COMMERCIAL

## 2023-03-08 ENCOUNTER — OFFICE VISIT (OUTPATIENT)
Dept: FAMILY MEDICINE | Facility: CLINIC | Age: 48
End: 2023-03-08
Payer: COMMERCIAL

## 2023-03-08 VITALS
TEMPERATURE: 98.2 F | BODY MASS INDEX: 25.92 KG/M2 | SYSTOLIC BLOOD PRESSURE: 124 MMHG | WEIGHT: 151 LBS | RESPIRATION RATE: 18 BRPM | OXYGEN SATURATION: 100 % | HEART RATE: 60 BPM | DIASTOLIC BLOOD PRESSURE: 82 MMHG

## 2023-03-08 DIAGNOSIS — R05.3 PERSISTENT COUGH FOR 3 WEEKS OR LONGER: ICD-10-CM

## 2023-03-08 DIAGNOSIS — R05.3 PERSISTENT COUGH FOR 3 WEEKS OR LONGER: Primary | ICD-10-CM

## 2023-03-08 DIAGNOSIS — R07.89 CHEST HEAVINESS: ICD-10-CM

## 2023-03-08 DIAGNOSIS — J20.9 ACUTE BRONCHITIS WITH SYMPTOMS > 10 DAYS: ICD-10-CM

## 2023-03-08 PROCEDURE — 71046 X-RAY EXAM CHEST 2 VIEWS: CPT | Mod: TC | Performed by: RADIOLOGY

## 2023-03-08 PROCEDURE — 99214 OFFICE O/P EST MOD 30 MIN: CPT | Performed by: FAMILY MEDICINE

## 2023-03-08 RX ORDER — AZITHROMYCIN 250 MG/1
TABLET, FILM COATED ORAL
Qty: 6 TABLET | Refills: 0 | Status: SHIPPED | OUTPATIENT
Start: 2023-03-08 | End: 2023-03-13

## 2023-03-08 RX ORDER — PREDNISONE 20 MG/1
TABLET ORAL
Qty: 15 TABLET | Refills: 0 | Status: SHIPPED | OUTPATIENT
Start: 2023-03-08 | End: 2023-11-01

## 2023-03-08 RX ORDER — ALBUTEROL SULFATE 90 UG/1
2 AEROSOL, METERED RESPIRATORY (INHALATION) ONCE
Status: COMPLETED | OUTPATIENT
Start: 2023-03-08 | End: 2023-03-08

## 2023-03-08 RX ORDER — FAMOTIDINE 40 MG/1
40 TABLET, FILM COATED ORAL 2 TIMES DAILY
Qty: 60 TABLET | Refills: 0 | Status: SHIPPED | OUTPATIENT
Start: 2023-03-08 | End: 2023-11-01

## 2023-03-08 RX ADMIN — ALBUTEROL SULFATE 2 PUFF: 90 INHALANT RESPIRATORY (INHALATION) at 17:08

## 2023-03-08 NOTE — PATIENT INSTRUCTIONS
Normal chest xray.     Azithromycin as directed for possible secondary bacterial bronchitis    Albuterol inhaler with spacer 2 puffs every 4-6 hours as needed for chest heaviness or wheeze or cough or shortness of breath.     If not improving may start prednisone 40mg daily with food for 5 days.     For GI protection, take pepcid 40mg twice daily. Gastric reflux is common with such a persistent cough leading to esophageal tightness and vocal cord irritation.     Honey in warm water, lozenges, tessalon, robitussin or mucinex tabs as needed for cough.     Recheck if worse or no better.
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
## 2023-03-15 ENCOUNTER — TRANSFERRED RECORDS (OUTPATIENT)
Dept: HEALTH INFORMATION MANAGEMENT | Facility: CLINIC | Age: 48
End: 2023-03-15
Payer: COMMERCIAL

## 2023-04-05 ENCOUNTER — OFFICE VISIT (OUTPATIENT)
Dept: FAMILY MEDICINE | Facility: CLINIC | Age: 48
End: 2023-04-05
Payer: COMMERCIAL

## 2023-04-05 VITALS
WEIGHT: 152 LBS | RESPIRATION RATE: 16 BRPM | TEMPERATURE: 98.3 F | OXYGEN SATURATION: 99 % | SYSTOLIC BLOOD PRESSURE: 132 MMHG | BODY MASS INDEX: 26.09 KG/M2 | HEART RATE: 77 BPM | DIASTOLIC BLOOD PRESSURE: 81 MMHG

## 2023-04-05 DIAGNOSIS — B02.9 HERPES ZOSTER WITHOUT COMPLICATION: Primary | ICD-10-CM

## 2023-04-05 PROCEDURE — 99213 OFFICE O/P EST LOW 20 MIN: CPT | Performed by: PHYSICIAN ASSISTANT

## 2023-04-05 RX ORDER — VALACYCLOVIR HYDROCHLORIDE 1 G/1
1000 TABLET, FILM COATED ORAL 3 TIMES DAILY
Qty: 21 TABLET | Refills: 0 | Status: SHIPPED | OUTPATIENT
Start: 2023-04-05 | End: 2023-11-01

## 2023-04-05 NOTE — PATIENT INSTRUCTIONS
You were seen today for a rash due to shingles (the same virus that causes chickenpox). The shingles rash can occur in individuals of all ages, but it is much more common in adults aged 50 years and older.    Symptom management:  - If prescribed, take antivirals as instructed  - Keep rash clean and dry  - May use calamine lotion for rash itchiness  - May use cool compress or soak in cool water to ease discomfort  - Avoid contact with those who have not been vaccinated for chickenpox and who have not been previously infected with chickenpox    Reasons to return for re-evaluation immediately:  - Rash begins to involve the eye or ear  - Develop a fever of 100.4 or higher  - Develop worsening, spreading redness     Otherwise, if no improvement in symptoms in 7-10 days, follow-up with your primary care provider.    Shingles (Herpes Zoster)  Shingles is also called herpes zoster. It is a painful skin rash caused by the herpes zoster virus. This is the same virus that causes chickenpox. After a person has chickenpox, the virus remains inactive in the nerve cells. Years later, the virus can become active again and travel to the skin. Most people have shingles only once, but it is possible to have it more than once.  What are the symptoms of shingles?  The first sign of shingles is usually pain, burning, tingling, or itching on one part of your face or body. You may also feel as if you have the flu, with fever and chills.  A red rash with small blisters appears within a few days. The rash may appear as follows:   The blisters can occur anywhere, but they re most common on the back, chest, or abdomen.  They usually appear on only one side of the body, spreading along the nerve pathway where the virus was inactive.   The rash can also form around an eye, along one side of the face or neck, or in the mouth.  In a few people, usually those with weakened immune systems, shingles appear on more than one part of the body at  once.  After a few days, the blisters become dry and form a crust. The crust falls off in days to weeks. The blisters generally do not leave scars.  How is shingles treated?  For most people, shingles heals on its own in a few weeks. But treatment is recommended to help relieve pain, speed healing, and reduce the risk of complications. Antiviral medicines are prescribed within the first 72 hours of the appearance of the rash. To lessen symptoms:  Apply ice packs (wrapped in a thin towel) or cool compresses, or soak in a cool bath.  Use calamine lotion to calm itchy skin.  Ask your healthcare provider about over-the-counter pain relievers. If your pain is severe, your healthcare provider may prescribe stronger pain medicines.  What are the complications of shingles?  Shingles often goes away with no lasting effects. But some people have serious problems long after the blisters have healed:  Postherpetic neuralgia. This is the most common complication. It is severe nerve pain at the place where the rash used to be. It can last for months, or even years after you have had shingles. Medicines can be prescribed to help relieve the pain and improve quality of life.  Bacterial infection. Shingles blisters may become infected with bacteria. Antibiotic medicine is used to treat the infection.  Eye problems. A person with shingles on the face should see his or her healthcare provider right away. Shingles can cause serious problems with vision, and even blindness..   When to seek medical care  Contact your healthcare provider if you experience any of the following:  Symptoms that don t go away with treatment  A rash or blisters near your eye  Increased drainage, fever, or rash after treatment, or severe pain that doesn t go away   How can shingles be prevented?  You can only get shingles if you have had chicken pox in the past. Those who have never had chickenpox can get the virus from you. Although instead of developing  shingles, the person may get chickenpox. Until your blisters form scabs, avoid contact with others, especially the following:  Pregnant women who have never had chickenpox or the vaccine  Infants who were born early (prematurely) or who had low weight at birth  People with weak immune system (for example, people receiving chemotherapy for cancer, people who have had organ transplants, or people with HIV infections)     The shingles vaccine  If you re 60 years of age or older, ask your healthcare provider if you should receive the shingles vaccine. The vaccine makes it less likely that you will develop shingles. If you do develop shingles, your symptoms will likely be milder than if you hadn t been vaccinated. Note: Although the vaccine is licensed for people 50 years of age or older, the CDC does not recommend the vaccine for those who are 50 to 59 years old.   Date Last Reviewed: 10/1/2016    3327-8679 [employer]. [address] [place] [address], [place] [address]. All rights reserved. This information is not intended as a substitute for professional medical care. Always follow your healthcare professional's instructions.

## 2023-04-05 NOTE — LETTER
[facility]  [address] [address]  Phone: [phone]  Fax: [phone]    April 5, 2023        Khadra Cummings  [address] [address]          To whom it may concern:    RE: Khadra Cummings    She is excused from work for 4/5/23 - 4/12/23.  May return sooner as tolerated    Please contact me for questions or concerns.      Sincerely,        Mikki Spence PA-C

## 2023-04-05 NOTE — PROGRESS NOTES
Assessment & Plan:      Problem List Items Addressed This Visit    None  Visit Diagnoses     Herpes zoster without complication    -  Primary    Relevant Medications    valACYclovir (VALTREX) 1000 mg tablet        Medical Decision Making  Patient presents with blistering, painful rash of the right thigh most consistent with herpes zoster.  Recommend oral antivirals.  Discussed treatment and symptomatic care.  Allergies and medication interactions reviewed.  Discussed signs of worsening symptoms and when to follow-up with PCP if no symptom improvement.     Subjective:      Khadra Cummings is a 47 year old female here for evaluation of painful rash on the right thigh.  Onset of symptoms was 4 to 5 days ago.  Patient initially noted pains that she attributed to a pinched nerve.  She tried massaging the region with limited improvement.  About 2 days later she then developed a pink rash that was tender to palpation across the posterior right thigh.  This rash continued to progress and became more red and now has clustered blisters.  Patient otherwise denies fevers.     The following portions of the patient's history were reviewed and updated as appropriate: allergies, current medications, and problem list.     Review of Systems  Pertinent items are noted in HPI.    Allergies  No Known Allergies    Family History   Problem Relation Age of Onset     Heart Disease Father      Diabetes Father      Hypertension Father      Hyperlipidemia Father      Diabetes Maternal Grandmother      Asthma Paternal Grandfather      Asthma Paternal Uncle        Social History     Tobacco Use     Smoking status: Never     Smokeless tobacco: Never   Vaping Use     Vaping status: Not on file   Substance Use Topics     Alcohol use: No        Objective:      /81   Pulse 77   Temp 98.3  F (36.8  C) (Oral)   Resp 16   Wt 68.9 kg (152 lb)   LMP 02/16/2023   SpO2 99%   BMI 26.09 kg/m    General appearance - alert, well appearing, and in no  distress and non-toxic  Extremities - Right lower extremity: Forage motion of all joints without difficulty  Skin - Clustered vesicular rash with surrounding erythema affecting a single dermatome from the tailbone down the posterior right thigh not extending past the right knee    The use of Dragon/[employer] dictation services was used to construct the content of this note; any grammatical errors are non-intentional. Please contact the author directly if you are in need of any clarification.

## 2023-04-07 ENCOUNTER — NURSE TRIAGE (OUTPATIENT)
Dept: NURSING | Facility: CLINIC | Age: 48
End: 2023-04-07
Payer: COMMERCIAL

## 2023-04-07 NOTE — TELEPHONE ENCOUNTER
calling. Consent on file to communicate.    Reports that patient recently dx with shingles, and prescribed medication.  calling with general questions about shingles, that nurse was able to answer. He had no further questions.    Hoda Coronado RN on 4/7/2023 at 12:42 PM

## 2023-04-07 NOTE — TELEPHONE ENCOUNTER
Writer spoke to patient regarding dx.   Following home care relayed to patient:   Home care    Medicines may be prescribed to help relieve pain. Take these medicines as directed. Ask your healthcare provider or pharmacist before using over-the-counter medicines for helping treat pain and itching.    In certain cases, antiviral medicines may be prescribed to reduce pain, shorten the illness, and prevent neuralgia. Take these medicines as directed.    Compresses made from a solution of cool water mixed with cornstarch or baking soda may help relieve pain and itching.     Gently wash skin daily with soap and water to help prevent infection. Be certain to rinse off all of the soap, which can be irritating.    Trim fingernails and try not to scratch. Scratching the sores may leave scars.    Stay home from work or school until all blisters have formed a crust and you are no longer contagious.    When to get medical advice  Call your healthcare provider if you have any of these:    Fever of 100.4 F (38 C) or higher, or as directed by your provider    Affected skin is on the face or neck and any of the following occur:  ? Headache  ? Eye pain  ? Changes in vision  ? Sores near the eye  ? Weakness of facial muscles    Blisters occurring on new areas of the body    Joint pain, redness, or swelling    Signs of skin infection: colored fluid leaking from the sores, warmth, increasing redness, fever, or increasing pain    Symptoms get worse or new symptoms occur    Writer assisted patient with scheduling appointment for 4/12/23. Denies any further questions.     NATE CornejoN, RN  [facility]

## 2023-04-13 ENCOUNTER — NURSE TRIAGE (OUTPATIENT)
Dept: NURSING | Facility: CLINIC | Age: 48
End: 2023-04-13
Payer: COMMERCIAL
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
## 2023-04-18 ENCOUNTER — OFFICE VISIT (OUTPATIENT)
Dept: FAMILY MEDICINE | Facility: CLINIC | Age: 48
End: 2023-04-18
Payer: COMMERCIAL

## 2023-04-18 VITALS
RESPIRATION RATE: 13 BRPM | OXYGEN SATURATION: 100 % | TEMPERATURE: 98.3 F | WEIGHT: 151 LBS | DIASTOLIC BLOOD PRESSURE: 83 MMHG | SYSTOLIC BLOOD PRESSURE: 121 MMHG | HEART RATE: 59 BPM | BODY MASS INDEX: 25.92 KG/M2

## 2023-04-18 DIAGNOSIS — B02.9 HERPES ZOSTER WITHOUT COMPLICATION: Primary | ICD-10-CM

## 2023-04-18 PROCEDURE — 99213 OFFICE O/P EST LOW 20 MIN: CPT | Performed by: PHYSICIAN ASSISTANT

## 2023-04-18 NOTE — PROGRESS NOTES
Assessment & Plan:      Problem List Items Addressed This Visit    None  Visit Diagnoses     Herpes zoster without complication    -  Primary    Relevant Medications    diclofenac (VOLTAREN) 1 % topical gel        Medical Decision Making  Patient with recent diagnosis of herpes zoster presents with ongoing pains in the same region the rash is present.  Suspect likely ongoing herpes zoster symptoms.  Rash appears to be showing good signs of improvement.  Recommend trial of Voltaren topical gel as needed along with cold compresses and over-the-counter analgesics.  Discussed treatment and symptomatic care.  Allergies and medication interactions reviewed.  Discussed signs of worsening symptoms and when to follow-up with PCP if no symptom improvement.     Subjective:      Khadra Cummings is a 47 year old female here for evaluation of ongoing pains following a diagnosis of shingles rash of the posterior right thigh.  Patient notes that rash has improved significantly, but she continues to have occasional throbbing pains in that region especially when she sits for too long or stands for too long.  Patient does get some temporary relief from cold compresses and ibuprofen.  She is wondering if there is anything else to help with the discomfort.     The following portions of the patient's history were reviewed and updated as appropriate: allergies, current medications, and problem list.     Review of Systems  Pertinent items are noted in HPI.    Allergies  No Known Allergies    Family History   Problem Relation Age of Onset     Heart Disease Father      Diabetes Father      Hypertension Father      Hyperlipidemia Father      Diabetes Maternal Grandmother      Asthma Paternal Grandfather      Asthma Paternal Uncle        Social History     Tobacco Use     Smoking status: Never     Smokeless tobacco: Never   Vaping Use     Vaping status: Not on file   Substance Use Topics     Alcohol use: No        Objective:      /83    Pulse 59   Temp 98.3  F (36.8  C)   Resp 13   Wt 68.5 kg (151 lb)   LMP 02/16/2023   SpO2 100%   BMI 25.92 kg/m    General appearance - alert, well appearing, and in no distress and non-toxic    The use of Dragon/[employer] dictation services was used to construct the content of this note; any grammatical errors are non-intentional. Please contact the author directly if you are in need of any clarification.

## 2023-06-08 ENCOUNTER — NURSE TRIAGE (OUTPATIENT)
Dept: FAMILY MEDICINE | Facility: CLINIC | Age: 48
End: 2023-06-08
Payer: COMMERCIAL

## 2023-06-08 DIAGNOSIS — N89.8 VAGINAL DISCHARGE: Primary | ICD-10-CM

## 2023-06-08 RX ORDER — FLUCONAZOLE 150 MG/1
150 TABLET ORAL ONCE
Qty: 2 TABLET | Refills: 0 | Status: SHIPPED | OUTPATIENT
Start: 2023-06-08 | End: 2023-06-08

## 2023-06-08 NOTE — TELEPHONE ENCOUNTER
"Patient reports thinking she has a yeast infection.  Has discharge, no rash.  States had rx prescribed in the past that helped.  Please see Triage below.  SUZY Jeffers RN  [facility]    Nurse Triage SBAR    Is this a 2nd Level Triage? NO    Situation:   Patient reports having vaginal discharge - off white in color and \"little bit chunky\".    Background:   Was treated on 4/5/23 with Valtrex for rash.      Assessment:   Patient reports no rash, but discharge. Denies a fever, no pain with urination, no abdominal pain and no foul odor with discharge or with urine.  Denies any itching or vaginal bleeding.      Protocol Recommended Disposition:   See in Office Within 3 Days - no availability.     Recommendation:   Patient states that when this happened a couple years ago the rx helped.  She has not tried any OTC medications or treatment.  Please advise if she should go in to walk in clinic or seen in clinic or have medication prescribed.      Routed to provider    Does the patient meet one of the following criteria for ADS visit consideration? 18+ years old, with an [facility] PCP     TIP  Providers, please consider if this condition is appropriate for management at one of our Acute and Diagnostic Services sites.     If patient is a good candidate, please use dotphrase <dot>triageresponse and select Refer to ADS to document.  Tried - wash and powder and has not helped.    Reason for Disposition    Symptoms of a yeast infection' (i.e., itchy, white discharge, not bad smelling) and not improved > 3 days following Care Advice    Additional Information    Negative: Pain or burning with passing urine (urination) is main symptom    Negative: Pregnant with vaginal discharge    Negative: SEVERE abdominal pain (e.g., excruciating)    Negative: Patient sounds very sick or weak to the triager    Negative: Yellow or green vaginal discharge and has a fever    Negative: Constant abdominal pain lasting > 2 " "hours    Negative: Mild lower abdominal pain comes and goes (cramps) that lasts > 24 hours    Negative: Genital area looks infected (e.g., draining sore, spreading redness)    Negative: Rash is tiny water blisters (3 or more)    Negative: Patient wants to be seen    Negative: Rash (e.g., redness, tiny bumps, sore) of genital area present > 24 hours    Negative: Bad smelling vaginal discharge    Negative: Abnormal color vaginal discharge (i.e., yellow, green, gray)    Answer Assessment - Initial Assessment Questions  1. DISCHARGE: \"Describe the discharge.\" (e.g., white, yellow, green, gray, foamy, cottage cheese-like)      Looks little bit chunky - off white  2. ODOR: \"Is there a bad odor?\"      no  3. ONSET: \"When did the discharge begin?\"      Couple days ago.  4. RASH: \"Is there a rash in that area?\" If Yes, ask: \"Describe it.\" (e.g., redness, blisters, sores, bumps)      no  5. ABDOMINAL PAIN: \"Are you having any abdominal pain?\" If Yes, ask: \"What does it feel like? \" (e.g., crampy, dull, intermittent, constant)       no  6. ABDOMINAL PAIN SEVERITY: If present, ask: \"How bad is it?\"  (e.g., mild, moderate, severe)   - MILD - doesn't interfere with normal activities    - MODERATE - interferes with normal activities or awakens from sleep    - SEVERE - patient doesn't want to move (R/O peritonitis)       no  7. CAUSE: \"What do you think is causing the discharge?\" \"Have you had the same problem before? What happened then?\"      Yes - had before.  A couple years ago.  8. OTHER SYMPTOMS: \"Do you have any other symptoms?\" (e.g., fever, itching, vaginal bleeding, pain with urination, injury to genital area, vaginal foreign body)      When it did start felt like had a rash.  No itchy.  No vaginal bleeding.  9. PREGNANCY: \"Is there any chance you are pregnant?\" \"When was your last menstrual period?\"      no    Protocols used: VAGINAL [id number]      "

## 2023-06-08 NOTE — TELEPHONE ENCOUNTER
Typically for this I would ask that she submit an e visit. I'm sending in some medication for a yeast infection for now, if this doesn't help then I do recommend being seen.

## 2023-06-08 NOTE — TELEPHONE ENCOUNTER
Spoke to patient and relayed message from Dr. Mcgregor.  Patient verbalized understanding.     NATE JeffersN RN  [facility]

## 2023-06-28 ENCOUNTER — OFFICE VISIT (OUTPATIENT)
Dept: FAMILY MEDICINE | Facility: CLINIC | Age: 48
End: 2023-06-28
Payer: COMMERCIAL

## 2023-06-28 VITALS
HEIGHT: 64 IN | RESPIRATION RATE: 16 BRPM | DIASTOLIC BLOOD PRESSURE: 70 MMHG | HEART RATE: 65 BPM | OXYGEN SATURATION: 99 % | SYSTOLIC BLOOD PRESSURE: 102 MMHG | WEIGHT: 150 LBS | TEMPERATURE: 97.7 F | BODY MASS INDEX: 25.61 KG/M2

## 2023-06-28 DIAGNOSIS — N89.8 VAGINAL DISCHARGE: ICD-10-CM

## 2023-06-28 DIAGNOSIS — Z86.19 HISTORY OF SHINGLES: Primary | ICD-10-CM

## 2023-06-28 DIAGNOSIS — L65.9 HAIR LOSS: ICD-10-CM

## 2023-06-28 DIAGNOSIS — Z23 IMMUNIZATION DUE: ICD-10-CM

## 2023-06-28 PROBLEM — K63.5 POLYP OF COLON: Status: ACTIVE | Noted: 2023-03-15

## 2023-06-28 PROBLEM — K64.9 HEMORRHOIDS: Status: ACTIVE | Noted: 2023-03-15

## 2023-06-28 PROBLEM — D12.3 BENIGN NEOPLASM OF TRANSVERSE COLON: Status: ACTIVE | Noted: 2023-03-17

## 2023-06-28 LAB
ALBUMIN SERPL BCG-MCNC: 4 G/DL (ref 3.5–5.2)
ALP SERPL-CCNC: 90 U/L (ref 35–104)
ALT SERPL W P-5'-P-CCNC: 24 U/L (ref 0–50)
ANION GAP SERPL CALCULATED.3IONS-SCNC: 8 MMOL/L (ref 7–15)
AST SERPL W P-5'-P-CCNC: 13 U/L (ref 0–45)
BILIRUB SERPL-MCNC: 0.2 MG/DL
BUN SERPL-MCNC: 11.6 MG/DL (ref 6–20)
CALCIUM SERPL-MCNC: 8.9 MG/DL (ref 8.6–10)
CHLORIDE SERPL-SCNC: 103 MMOL/L (ref 98–107)
CREAT SERPL-MCNC: 0.71 MG/DL (ref 0.51–0.95)
DEPRECATED HCO3 PLAS-SCNC: 25 MMOL/L (ref 22–29)
ERYTHROCYTE [DISTWIDTH] IN BLOOD BY AUTOMATED COUNT: 12.9 % (ref 10–15)
FERRITIN SERPL-MCNC: 16 NG/ML (ref 6–175)
GFR SERPL CREATININE-BSD FRML MDRD: >90 ML/MIN/1.73M2
GLUCOSE SERPL-MCNC: 99 MG/DL (ref 70–99)
HCT VFR BLD AUTO: 34 % (ref 35–47)
HGB BLD-MCNC: 11.1 G/DL (ref 11.7–15.7)
MCH RBC QN AUTO: 28.5 PG (ref 26.5–33)
MCHC RBC AUTO-ENTMCNC: 32.6 G/DL (ref 31.5–36.5)
MCV RBC AUTO: 87 FL (ref 78–100)
PLATELET # BLD AUTO: 226 10E3/UL (ref 150–450)
POTASSIUM SERPL-SCNC: 4.2 MMOL/L (ref 3.4–5.3)
PROT SERPL-MCNC: 6.7 G/DL (ref 6.4–8.3)
RBC # BLD AUTO: 3.9 10E6/UL (ref 3.8–5.2)
SODIUM SERPL-SCNC: 136 MMOL/L (ref 136–145)
TSH SERPL DL<=0.005 MIU/L-ACNC: 1.81 UIU/ML (ref 0.3–4.2)
VIT B12 SERPL-MCNC: 240 PG/ML (ref 232–1245)
WBC # BLD AUTO: 4.8 10E3/UL (ref 4–11)

## 2023-06-28 PROCEDURE — 85027 COMPLETE CBC AUTOMATED: CPT | Performed by: FAMILY MEDICINE

## 2023-06-28 PROCEDURE — 82728 ASSAY OF FERRITIN: CPT | Performed by: FAMILY MEDICINE

## 2023-06-28 PROCEDURE — 90746 HEPB VACCINE 3 DOSE ADULT IM: CPT | Performed by: FAMILY MEDICINE

## 2023-06-28 PROCEDURE — 99214 OFFICE O/P EST MOD 30 MIN: CPT | Performed by: FAMILY MEDICINE

## 2023-06-28 PROCEDURE — 82607 VITAMIN B-12: CPT | Performed by: FAMILY MEDICINE

## 2023-06-28 PROCEDURE — 80053 COMPREHEN METABOLIC PANEL: CPT | Performed by: FAMILY MEDICINE

## 2023-06-28 PROCEDURE — 36415 COLL VENOUS BLD VENIPUNCTURE: CPT | Performed by: FAMILY MEDICINE

## 2023-06-28 PROCEDURE — 84443 ASSAY THYROID STIM HORMONE: CPT | Performed by: FAMILY MEDICINE

## 2023-06-28 PROCEDURE — 90471 IMMUNIZATION ADMIN: CPT | Performed by: FAMILY MEDICINE

## 2023-06-28 RX ORDER — FLUCONAZOLE 150 MG/1
150 TABLET ORAL ONCE
Qty: 1 TABLET | Refills: 4 | Status: SHIPPED | OUTPATIENT
Start: 2023-06-28 | End: 2023-06-28

## 2023-06-28 ASSESSMENT — PAIN SCALES - GENERAL: PAINLEVEL: NO PAIN (0)

## 2023-06-28 NOTE — PATIENT INSTRUCTIONS
You can use some topical Capsaicin if you are having a day where the nerves are really bothering you. Ibuprofen is great as well.

## 2023-06-28 NOTE — PROGRESS NOTES
"  Assessment & Plan     History of shingles  -Mostly back to normal at this time although does still have some flaring of what sounds like some mild postherpetic neuralgia.  Discussed that she can use some topical capsaicin as needed for this and if things or not continuing to improve she will let me know.    Hair loss  -I suspect that this is telogen effluvium possibly related to the shingles infection or the stress that caused the shingles flare in general.  We will check labs as listed below particularly as she has had low her ferritin levels in the past.  If labs are abnormal we will treat accordingly otherwise if things or not improving she will let me know and I can refer her to dermatology.  - Ferritin  - Comprehensive metabolic panel (BMP + Alb, Alk Phos, ALT, AST, Total. Bili, TP)  - TSH with free T4 reflex  - Vitamin B12  - CBC with platelets  - Ferritin  - Comprehensive metabolic panel (BMP + Alb, Alk Phos, ALT, AST, Total. Bili, TP)  - TSH with free T4 reflex  - Vitamin B12  - CBC with platelets    Vaginal discharge  -Has had a few yeast infections in the past, patient expresses apprehension about having recurrent symptoms, refill for as needed usage of Diflucan sent to the pharmacy  - fluconazole (DIFLUCAN) 150 MG tablet  Dispense: 1 tablet; Refill: 4    Immunization due  - HEPATITIS B VACCINE, ADULT, IM             BMI:   Estimated body mass index is 25.75 kg/m  as calculated from the following:    Height as of this encounter: 1.626 m (5' 4\").    Weight as of this encounter: 68 kg (150 lb).   Weight management plan: Discussed healthy diet and exercise guidelines      Verona Mcgregor MD  [facility]   Khadra is a 47 year old, presenting for the following health issues:  Shingles (Follow up to make sure getting better or done. )        6/28/2023     7:51 AM   Additional Questions   Roomed by Emeli MITCHELL LPN     History of Present Illness       Reason for visit:  " "Following    She eats 0-1 servings of fruits and vegetables daily.She consumes 0 sweetened beverage(s) daily.She exercises with enough effort to increase her heart rate 10 to 19 minutes per day.  She exercises with enough effort to increase her heart rate 3 or less days per week.   She is taking medications regularly.       She is here today for follow up from shingles.     She had a cough prior to getting the shingles. She did find that she got better after about one week. She does still at times get some discomfort in the right thigh, depending on how she is sitting or sleeping and then it will self resolve after about a half day or so. She     She does note that she is losing lots of hair as well since having shingles. She did get the rash at the same time of her period. It was all over her right thigh from hip to knee.     She has not had any stress lately. She was on vacation in [place] [place] prior to getting the shingles and was able to see her family.       Review of Systems   Per above      Objective    /70   Pulse 65   Temp 97.7  F (36.5  C) (Oral)   Resp 16   Ht 1.626 m (5' 4\")   Wt 68 kg (150 lb)   LMP 06/18/2023 (Approximate)   SpO2 99%   Breastfeeding No   BMI 25.75 kg/m    Body mass index is 25.75 kg/m .  Physical Exam   GENERAL: healthy, alert and no distress  RESP: lungs clear to auscultation - no rales, rhonchi or wheezes  CV: regular rate and rhythm, normal S1 S2, no S3 or S4, no murmur, click or rub, no peripheral edema and peripheral pulses strong  MS: no gross musculoskeletal defects noted, no edema  SKIN: no suspicious lesions or rashes          "

## 2023-10-23 DIAGNOSIS — E55.9 VITAMIN D DEFICIENCY: ICD-10-CM

## 2023-10-23 RX ORDER — ERGOCALCIFEROL 1.25 MG/1
50000 CAPSULE, LIQUID FILLED ORAL WEEKLY
Qty: 12 CAPSULE | Refills: 0 | Status: SHIPPED | OUTPATIENT
Start: 2023-10-23 | End: 2024-01-22

## 2023-10-23 NOTE — TELEPHONE ENCOUNTER
Refill Request  Medication name: Pending Prescriptions:                       Disp   Refills    vitamin D2 (ERGOCALCIFEROL) 12148 units (*12 cap*3            Sig: Take 1 capsule (50,000 Units) by mouth once a           week    Requested Pharmacy:  [id number] [address] AT [facility] [address]

## 2023-10-30 ENCOUNTER — NURSE TRIAGE (OUTPATIENT)
Dept: NURSING | Facility: CLINIC | Age: 48
End: 2023-10-30

## 2023-10-30 DIAGNOSIS — Z20.822 EXPOSURE TO 2019 NOVEL CORONAVIRUS: Primary | ICD-10-CM

## 2023-10-30 NOTE — TELEPHONE ENCOUNTER
Nurse Triage SBAR    Is this a 2nd Level Triage? NO    Situation:   Spoke with 48 yr old Khadra who c/o:    Symptoms started yesterday, 10/29/23.  Fatigue  Heavy head; mild headache.  Mucus in throat  Denies cough.  Denies fever.  Denies difficulty breathing.  COVID-19 testing at 11:15 am today.    has tested positive for COVID-19 yesterday.  Requesting anti-viral treatment if possible.    COVID Positive/Requesting COVID treatment    Patient is positive for COVID and requesting treatment options.    Date of positive COVID test (PCR or at home)? Pending today 10/30/23 11:15 am.  Current COVID symptoms: fatigue, headache, and sore throat  Date COVID symptoms began: 10/29/23    Message should be routed to clinic RN pool. Best phone number to use for call back: [phone]  Consent: not needed    Assessment:   Mild symptoms.  Pending   COVID-19 test today.  Requesting antiviral.    Protocol Recommended Disposition:   Home care.    Recommendation:   Will consult with RN Pool regarding request for anti-viral treatment.  Care advice given per COVID-19 diagnosed/suspected protocol.         Routed to RN pool    Does the patient meet one of the following criteria for ADS visit consideration? 16+ years old, with an [facility] PCP     TIP  Providers, please consider if this condition is appropriate for management at one of our Acute and Diagnostic Services sites.     If patient is a good candidate, please use dotphrase <dot>triageresponse and select Refer to ADS to document.    Stacey Davalos RN  [facility]        Stacey Davalos RN 8:40 AM 10/30/2023  Reason for Disposition   COVID-19 infection suspected by caller or triager and mild symptoms (cough, fever, or others) and has not gotten tested yet    Additional Information   Negative: SEVERE difficulty breathing (e.g., struggling for each breath, speaks in single words)   Negative: Difficult to awaken or acting confused (e.g., disoriented, slurred speech)   Negative:  Bluish (or gray) lips or face now   Negative: Shock suspected (e.g., cold/pale/clammy skin, too weak to stand, low BP, rapid pulse)   Negative: Sounds like a life-threatening emergency to the triager   Negative: Diagnosed or suspected COVID-19 and symptoms lasting 3 or more weeks   Negative: COVID-19 exposure and no symptoms   Negative: COVID-19 vaccine reaction suspected (e.g., fever, headache, muscle aches) occurring 1 to 3 days after getting vaccine   Negative: COVID-19 vaccine, questions about   Negative: Lives with someone known to have influenza (flu test positive) and flu-like symptoms (e.g., cough, runny nose, sore throat, SOB; with or without fever)   Negative: Possible COVID-19 symptoms and triager concerned about severity of symptoms or other causes   Negative: COVID-19 and breastfeeding, questions about   Negative: SEVERE or constant chest pain or pressure  (Exception: Mild central chest pain, present only when coughing.)   Negative: MODERATE difficulty breathing (e.g., speaks in phrases, SOB even at rest, pulse 100-120)   Negative: Headache and stiff neck (can't touch chin to chest)   Negative: Oxygen level (e.g., pulse oximetry) 90% or lower   Negative: Chest pain or pressure  (Exception: MILD central chest pain, present only when coughing.)   Negative: Drinking very little and dehydration suspected (e.g., no urine > 12 hours, very dry mouth, very lightheaded)   Negative: Patient sounds very sick or weak to the triager   Negative: MILD difficulty breathing (e.g., minimal/no SOB at rest, SOB with walking, pulse <100)   Negative: Fever > 103 F (39.4 C)   Negative: Fever > 101 F (38.3 C) and over 60 years of age   Negative: Fever > 100.0 F (37.8 C) and bedridden (e.g., CVA, chronic illness, recovering from surgery)   Negative: HIGH RISK patient (e.g., weak immune system, age > 64 years, obesity with BMI of 30 or higher, pregnant, chronic lung disease or other chronic medical condition) and COVID symptoms  (e.g., cough, fever)  (Exceptions: Already seen by doctor or NP/PA and no new or worsening symptoms.)   Negative: HIGH RISK patient and influenza is widespread in the community and ONE OR MORE respiratory symptoms: cough, sore throat, runny or stuffy nose   Negative: HIGH RISK patient and influenza exposure within the last 7 days and ONE OR MORE respiratory symptoms: cough, sore throat, runny or stuffy nose   Negative: Fever present > 3 days (72 hours)   Negative: Fever returns after gone for over 24 hours and symptoms worse or not improved   Negative: Continuous (nonstop) coughing interferes with work or school and no improvement using cough treatment per Care Advice   Negative: Cough present > 3 weeks   Negative: COVID-19 infection suspected by caller or triager and mild symptoms (cough, fever, or others) and negative COVID-19 rapid test     COVID testing today at 11:15 am.   Negative: COVID-19 diagnosed by positive lab test (e.g., PCR, rapid self-test kit) and mild symptoms (e.g., cough, fever, others) and no complications or SOB   Negative: COVID-19 diagnosed by doctor (or NP/PA) and mild symptoms (e.g., cough, fever, others) and no complications or SOB   Negative: COVID-19 diagnosed and has mild nausea, vomiting or diarrhea   Commented on: Oxygen level (e.g., pulse oximetry) 91 to 94%     Hasn't tested.    Protocols used: Coronavirus (COVID-19) Diagnosed or [id number]

## 2023-10-30 NOTE — TELEPHONE ENCOUNTER
Scheduled with virtual urgent care today for Paxlovid treatment.    Debi Valerio, NATEN RN  [facility]

## 2023-10-30 NOTE — TELEPHONE ENCOUNTER
Debi Valerio RN called Khadra on 10/30 and left a message to return call to clinic. If patient calls back, please route to any available RN to triage.    SUZY Jeffers RN  [facility]

## 2023-10-31 ENCOUNTER — LAB (OUTPATIENT)
Dept: LAB | Facility: CLINIC | Age: 48
End: 2023-10-31
Payer: COMMERCIAL

## 2023-10-31 DIAGNOSIS — Z20.822 EXPOSURE TO 2019 NOVEL CORONAVIRUS: ICD-10-CM

## 2023-10-31 PROCEDURE — 87635 SARS-COV-2 COVID-19 AMP PRB: CPT

## 2023-10-31 NOTE — TELEPHONE ENCOUNTER
Spoke with . Patient had a rapid antigen test performed at pharmacy and it was negative. They would like a PCR test.  has recently tested positive. Patient is interested in Paxlovid as soon as possible due to recent shingles.     PCR pended for provider review.     Charla Monge RN  [facility]

## 2023-10-31 NOTE — TELEPHONE ENCOUNTER
calling and requesting call back.  Wife tested negative on 10/30, but is having symptoms. Patient is requesting a COVID PCR test at this time.    Please call  and discuss, on consent to communicate

## 2023-11-01 ENCOUNTER — VIRTUAL VISIT (OUTPATIENT)
Dept: URGENT CARE | Facility: CLINIC | Age: 48
End: 2023-11-01
Payer: COMMERCIAL

## 2023-11-01 DIAGNOSIS — U07.1 INFECTION DUE TO 2019 NOVEL CORONAVIRUS: Primary | ICD-10-CM

## 2023-11-01 LAB — SARS-COV-2 RNA RESP QL NAA+PROBE: POSITIVE

## 2023-11-01 PROCEDURE — 99213 OFFICE O/P EST LOW 20 MIN: CPT | Mod: 93

## 2023-11-01 NOTE — TELEPHONE ENCOUNTER
Patient called about positive covid test and is interested in receiving treatment.     RN COVID TREATMENT VISIT  11/01/23      The patient has been triaged and does not require a higher level of care.    Khadra Cummings  48 year old  Current weight? 150    Has the patient been seen by a primary care provider at an [facility] or [facility] within the past two years? Yes.   Have you been in close proximity to/do you have a known exposure to a person with a confirmed case of influenza? No.     General treatment eligibility:  Date of positive COVID test (PCR or at home)?  10/31/23    Are you or have you been hospitalized for this COVID-19 infection? No.   Have you received monoclonal antibodies or antiviral treatment for COVID-19 since this positive test? No.   Do you have any of the following conditions that place you at risk of being very sick from COVID-19?   No high risk conditions. Patient informed they do not qualify for treatment - patient would like to be considered due to recent diagnosis of shingles. Patient scheduled for virtual visit.   Charla Monge RN

## 2023-11-01 NOTE — PROGRESS NOTES
"Khadra is a 48 year old who is being evaluated via a billable telephone visit.      How would you like to obtain your AVS? MyChart  If the video visit is dropped, the invitation should be resent by:   Will anyone else be joining your video visit? No          Assessment & Plan     Infection due to 2019 novel coronavirus    - nirmatrelvir and ritonavir (PAXLOVID) 300 mg/100 mg therapy pack; Take 3 tablets by mouth 2 times daily for 5 days (Take 2 Nirmatrelvir tablets and 1 Ritonavir tablet twice daily for 5 days)         COVID-19 positive patient.  Encounter for consideration of medication intervention. Patient does qualify for a prescription. Full discussion with patient including medication options, risks and benefits. Potential drug interactions reviewed with patient.     Treatment Planned  Paxlovid sent to [facility] in [place]    Temporary change to home medications:  None     Estimated body mass index is 25.75 kg/m  as calculated from the following:    Height as of 6/28/23: 1.626 m (5' 4\").    Weight as of 6/28/23: 68 kg (150 lb).  GFR Estimate   Date Value Ref Range Status   06/28/2023 >90 >60 mL/min/1.73m2 Final   08/05/2020 >60 >60 mL/min/1.73m2 Final     Lab Results   Component Value Date    [id number] Positive (A) 10/31/2023       Return in about 1 week (around 11/8/2023), or if symptoms worsen or fail to improve.    Virtual Urgent Care  [facility] VIRTUAL URGENT CARE    Subjective   Khadra is a 48 year old, presenting for the following health issues:      HPI       COVID-19 Symptom Review  How many days ago did these symptoms start? 1-2  days ago    Are any of the following symptoms significant for you?  New or worsening difficulty breathing? No  Worsening cough? Yes, it's a dry cough.   Fever or chills? Yes, I felt feverish or had chills.  Headache: Yes  Sore throat: YES  Chest pain: No  Diarrhea: No  Body aches? YES    What treatments has patient tried? Acetaminophen   Does patient live in a " nursing home, group home, or shelter? No  Does patient have a way to get food/medications during quarantined? Yes, I have a friend or family member who can help me.              Review of Systems   Constitutional, HEENT, cardiovascular, pulmonary, gi and gu systems are negative, except as otherwise noted.      Objective           Vitals:  No vitals were obtained today due to virtual visit.    Physical Exam   GENERAL: Healthy, alert and no distress  RESP: No audible wheeze, cough.  No increased work of breathing with conversation.      Telephone visit lasted 6 minutes

## 2023-11-01 NOTE — PATIENT INSTRUCTIONS
For informational purposes only. Not to replace the advice of your health care provider. Copyright   2022 [facility]. All rights reserved. Clinically reviewed by Merlyn Butler, PharmD, BCACP. [place] [id number] - REV 06/23.  COVID-19 Outpatient Treatments  Your care team can help you find the best treatments for COVID-19. Talk to a health care provider or refer to the FDA medicine fact sheets below.  Paxlovid (nirmatrelvir and ritonavir): https://www.paxlovid.IZEA/resources  Molnupiravir (Lagevrio): https://www.fda.gov/media/141214/download  Important: We can only prescribe Paxlovid or Molnupiravir when it can be started within 5 days of first having symptoms.  Paxlovid (nimatrelvir and ritonavir)  How it works  Two medicines (nirmatrelvir and ritonavir) are taken together. They stop the virus from growing. Less amount of virus is easier for your body to fight.  Benefits  Lowers risk of a hospital stay or death from COVID-19.  How to take  Medicine comes in a daily container with both medicine tablets. Take by mouth twice daily (once in the morning, once at night) for 5 days.  The number of tablets to take varies by patient.  Don't chew or break capsules. Swallow whole.  When to take  Take it as soon as possible and within 5 days of your first symptoms.  Who can take it  Patients must be 12 years or older weigh at least 88 pounds. Paxlovid is the preferred treatment for pregnant patients.  Possible side effects  Can cause altered sense of taste, diarrhea (loose, watery stools), high blood pressure, muscle aches.  Medicine conflicts  Some medicines may conflict with Paxlovid and may cause serious side effects.  Tell your care team about all the medicines you take, including prescription and over-the-counter medicines, vitamins, and herbal supplements.  Your care team will review your medicines to make sure that you can safely take Paxlovid.  Cautions  Paxlovid is not advised for patients with severe  kidney or liver disease. If you have kidney or liver problems, the dose may need to be changed.  If you're pregnant or breastfeeding, talk to your care team about your options.  If you take hormonal birth control (such as the Pill), then you or your partner should also use a non-hormonal form of birth control (such as a condom). Keep doing this for 1 menstrual cycle after your last dose of Paxlovid.  Molnupiravir (lagevrio)  How it works  Stops the virus from growing. Less amount of virus is easier for your body to fight.  Benefits  Lowers risk of a hospital stay or death from COVID-19.  How to take  Take 4 capsules by mouth every 12 hours (4 in the morning and 4 at night) for 5 days. Don't chew or break capsules. Swallow whole.  When to take  Take as soon as possible and within 5 days of your first symptoms.  Who can take it  Patients must be 18 years or older.   Possible side effects  Diarrhea (loose, watery stools), nausea (feeling sick to your stomach), dizziness, headaches.  Medicine conflicts  Right now, there are no known conflicts with other drugs. But tell your care team about all medicines you take.  Cautions  This medicine is not advised for patients who are pregnant.  If you are someone who could become pregnant, use trusted birth control until 4 days after your last dose of molnupiravir.  If your partner could become pregnant, you should use trusted birth control until 3 months after your last dose of molnupiravir.      Coping with Life After COVID-19  Being in the hospital because of COVID-19 is scary. Going home can be scary, too. You may face changes to your life, the way you work or what you can eat. It s hard to adjust to change, and it s normal to feel afraid, frustrated or even angry. These feelings usually go away over time. If your feelings don t start to get better, it s called  adjustment disorder.      What signs should I look out for?  Adjustment disorder can happen to anyone in a time of  stress. It makes it hard to cope with daily life. You may feel lonely or fight with loved ones, even if you re glad to be home. Watch for these signs:  Fear or worry  Hard time focusing  Sadness or anger  Trouble talking to family or friends  Feeling like you don t fit in or isolating yourself  Problems with sleep   Drinking alcohol or taking drugs to cope    What can I do?  You can help yourself get better. Feeling you have control helps you move forward. You may wonder if you ll be able to do things you did before. Be patient. Do your best to make the most of every day. Try to build relationships, be as active as you can, eat right and keep a sense of humor. Avoid smoking and drinking too much alcohol. Call your family doctor or clinic if you re not sure what to do. They can guide you to care or other services.    When should I get help?  Think about getting counseling if your sadness or frustration gets worse. Together with a trained counselor, you can talk about your problems adjusting to life after your hospital stay. You can come up with new ways to handle changes that give you more control. Your family doctor or care team can help you find a counselor.     Get help if you re thinking about hurting yourself. If you need help right away, call 911 or go to the nearest emergency room. You can also try the Crisis Text Line.    Crisis Text Line: 257-074 (http://www.crisistextline.org)  The Crisis Text Line serves anyone, in any crisis. It gives free, 24/7 support. Here's how it works:  Text HOME to 726033 from anywhere in the [place], anytime, about any type of crisis.  A live, trained Crisis Counselor will text you back quickly.  The volunteer Crisis Counselor can help you move from a  hot moment  to a  cool moment.  They can also help you work out a safety plan.

## 2023-11-28 ENCOUNTER — PATIENT OUTREACH (OUTPATIENT)
Dept: CARE COORDINATION | Facility: CLINIC | Age: 48
End: 2023-11-28
Payer: COMMERCIAL

## 2023-12-17 ENCOUNTER — HEALTH MAINTENANCE LETTER (OUTPATIENT)
Age: 48
End: 2023-12-17

## 2023-12-26 ENCOUNTER — PATIENT OUTREACH (OUTPATIENT)
Dept: CARE COORDINATION | Facility: CLINIC | Age: 48
End: 2023-12-26
Payer: COMMERCIAL

## 2024-01-22 DIAGNOSIS — E55.9 VITAMIN D DEFICIENCY: ICD-10-CM

## 2024-01-22 RX ORDER — ERGOCALCIFEROL 1.25 MG/1
50000 CAPSULE, LIQUID FILLED ORAL WEEKLY
Qty: 12 CAPSULE | Refills: 0 | Status: SHIPPED | OUTPATIENT
Start: 2024-01-22

## 2024-01-22 NOTE — TELEPHONE ENCOUNTER
Refill Request  Medication name: Pending Prescriptions:                       Disp   Refills    vitamin D2 (ERGOCALCIFEROL) 21195 units (*12 cap*0            Sig: Take 1 capsule (50,000 Units) by mouth once a           week    Requested Pharmacy:  [id number] [address] AT [facility] [place] [address]

## 2024-01-31 ENCOUNTER — HOSPITAL ENCOUNTER (OUTPATIENT)
Dept: MAMMOGRAPHY | Facility: CLINIC | Age: 49
Discharge: HOME | End: 2024-01-31
Admitting: FAMILY MEDICINE
Payer: COMMERCIAL

## 2024-01-31 DIAGNOSIS — Z12.31 ENCOUNTER FOR SCREENING MAMMOGRAM FOR BREAST CANCER: ICD-10-CM

## 2024-01-31 PROCEDURE — 77063 BREAST TOMOSYNTHESIS BI: CPT

## 2024-05-01 ENCOUNTER — OFFICE VISIT (OUTPATIENT)
Dept: FAMILY MEDICINE | Facility: CLINIC | Age: 49
End: 2024-05-01
Payer: COMMERCIAL

## 2024-05-01 VITALS
BODY MASS INDEX: 26.29 KG/M2 | WEIGHT: 154 LBS | OXYGEN SATURATION: 98 % | SYSTOLIC BLOOD PRESSURE: 100 MMHG | HEART RATE: 80 BPM | TEMPERATURE: 98.1 F | RESPIRATION RATE: 16 BRPM | HEIGHT: 64 IN | DIASTOLIC BLOOD PRESSURE: 64 MMHG

## 2024-05-01 DIAGNOSIS — Z23 IMMUNIZATION DUE: ICD-10-CM

## 2024-05-01 DIAGNOSIS — Z00.00 ENCOUNTER FOR ROUTINE HISTORY AND PHYSICAL EXAMINATION OF ADULT: Primary | ICD-10-CM

## 2024-05-01 DIAGNOSIS — Z12.4 CERVICAL CANCER SCREENING: ICD-10-CM

## 2024-05-01 DIAGNOSIS — D64.9 ANEMIA, UNSPECIFIED TYPE: ICD-10-CM

## 2024-05-01 DIAGNOSIS — R06.83 SNORING: ICD-10-CM

## 2024-05-01 DIAGNOSIS — L30.9 DERMATITIS: ICD-10-CM

## 2024-05-01 DIAGNOSIS — Z13.220 LIPID SCREENING: ICD-10-CM

## 2024-05-01 DIAGNOSIS — E55.9 VITAMIN D DEFICIENCY: ICD-10-CM

## 2024-05-01 DIAGNOSIS — E53.8 VITAMIN B12 DEFICIENCY: ICD-10-CM

## 2024-05-01 LAB
ERYTHROCYTE [DISTWIDTH] IN BLOOD BY AUTOMATED COUNT: 13.6 % (ref 10–15)
HCT VFR BLD AUTO: 34.9 % (ref 35–47)
HGB BLD-MCNC: 11.1 G/DL (ref 11.7–15.7)
MCH RBC QN AUTO: 27.7 PG (ref 26.5–33)
MCHC RBC AUTO-ENTMCNC: 31.8 G/DL (ref 31.5–36.5)
MCV RBC AUTO: 87 FL (ref 78–100)
PLATELET # BLD AUTO: 260 10E3/UL (ref 150–450)
RBC # BLD AUTO: 4.01 10E6/UL (ref 3.8–5.2)
WBC # BLD AUTO: 5.7 10E3/UL (ref 4–11)

## 2024-05-01 PROCEDURE — 82306 VITAMIN D 25 HYDROXY: CPT | Performed by: FAMILY MEDICINE

## 2024-05-01 PROCEDURE — 80061 LIPID PANEL: CPT | Performed by: FAMILY MEDICINE

## 2024-05-01 PROCEDURE — 90471 IMMUNIZATION ADMIN: CPT | Performed by: FAMILY MEDICINE

## 2024-05-01 PROCEDURE — 84443 ASSAY THYROID STIM HORMONE: CPT | Performed by: FAMILY MEDICINE

## 2024-05-01 PROCEDURE — 82607 VITAMIN B-12: CPT | Performed by: FAMILY MEDICINE

## 2024-05-01 PROCEDURE — 99214 OFFICE O/P EST MOD 30 MIN: CPT | Mod: 25 | Performed by: FAMILY MEDICINE

## 2024-05-01 PROCEDURE — 36415 COLL VENOUS BLD VENIPUNCTURE: CPT | Performed by: FAMILY MEDICINE

## 2024-05-01 PROCEDURE — 90746 HEPB VACCINE 3 DOSE ADULT IM: CPT | Performed by: FAMILY MEDICINE

## 2024-05-01 PROCEDURE — 99396 PREV VISIT EST AGE 40-64: CPT | Mod: 25 | Performed by: FAMILY MEDICINE

## 2024-05-01 PROCEDURE — 85027 COMPLETE CBC AUTOMATED: CPT | Performed by: FAMILY MEDICINE

## 2024-05-01 PROCEDURE — 87624 HPV HI-RISK TYP POOLED RSLT: CPT | Performed by: FAMILY MEDICINE

## 2024-05-01 PROCEDURE — G0145 SCR C/V CYTO,THINLAYER,RESCR: HCPCS | Performed by: FAMILY MEDICINE

## 2024-05-01 RX ORDER — BETAMETHASONE DIPROPIONATE 0.5 MG/G
OINTMENT, AUGMENTED TOPICAL
Qty: 45 G | Refills: 1 | Status: SHIPPED | OUTPATIENT
Start: 2024-05-01

## 2024-05-01 RX ORDER — FLUTICASONE PROPIONATE 50 MCG
2 SPRAY, SUSPENSION (ML) NASAL DAILY
Qty: 16 G | Refills: 11 | Status: SHIPPED | OUTPATIENT
Start: 2024-05-01

## 2024-05-01 SDOH — HEALTH STABILITY: PHYSICAL HEALTH: ON AVERAGE, HOW MANY DAYS PER WEEK DO YOU ENGAGE IN MODERATE TO STRENUOUS EXERCISE (LIKE A BRISK WALK)?: 1 DAY

## 2024-05-01 SDOH — HEALTH STABILITY: PHYSICAL HEALTH: ON AVERAGE, HOW MANY MINUTES DO YOU ENGAGE IN EXERCISE AT THIS LEVEL?: 10 MIN

## 2024-05-01 ASSESSMENT — SOCIAL DETERMINANTS OF HEALTH (SDOH): HOW OFTEN DO YOU GET TOGETHER WITH FRIENDS OR RELATIVES?: ONCE A WEEK

## 2024-05-01 NOTE — PROGRESS NOTES
Preventive Care Visit  [facility]  Verona Mcgregor MD, Family Medicine  May 1, 2024      Assessment & Plan     Encounter for routine history and physical examination of adult  -Routine health maintenance discussion:  No smoking, limited alcohol (7 or less servings per week), 5 fruits/veg servings per day, 200 minutes of exercise per week.  Daily calcium/vitamin D guidelines, bone health, colon cancer screening beginning at age 50.  Accident avoidance, sun screen.  -Appears to have lateral epicondylitis on the left, improving with bracing and I did provide her with some exercises.  If not continuing to improve we could certainly consider an injection here or referral to orthopedics.    Snoring  -Okay to use over-the-counter Flonase, follow-up if not improving and can consider referral to sleep medicine  - fluticasone (FLONASE) 50 MCG/ACT nasal spray  Dispense: 16 g; Refill: 11    Vitamin D deficiency  -Has been historically vitamin D deficient in the past, will update lab work today  - Vitamin D Deficiency  - Vitamin D Deficiency    Anemia, unspecified type  -Has had anemia in the past, updating lab work today  - CBC with platelets  - TSH with free T4 reflex  - CBC with platelets  - TSH with free T4 reflex    Vitamin B12 deficiency  -Has had B12 deficiency in the past  - Vitamin B12  - Vitamin B12    Dermatitis  -Longstanding history of recurrent dermatitis on the shins bilaterally.  Will refer to dermatology as it continues to be an issue, we will have her trial a different steroid cream as well.  - augmented betamethasone dipropionate (DIPROLENE-AF) 0.05 % external ointment  Dispense: 45 g; Refill: 1  - Adult Dermatology  Referral    Lipid screening  - Lipid panel reflex to direct LDL Fasting  - Lipid panel reflex to direct LDL Fasting    Cervical cancer screening  - Pap Screen with HPV - recommended age 30 - 65 years  - HPV Hold (Lab Only)  - HPV High Risk Types DNA  "Cervical    Immunization due  - HEPATITIS B, ADULT 20+ (ENGERIX-B/RECOMBIVAX HB)  - HEPATITIS B, ADULT 20+ (ENGERIX-B/RECOMBIVAX HB)        BMI  Estimated body mass index is 26.23 kg/m  as calculated from the following:    Height as of this encounter: 1.632 m (5' 4.25\").    Weight as of this encounter: 69.9 kg (154 lb).   Weight management plan: Discussed healthy diet and exercise guidelines    Counseling  Appropriate preventive services were discussed with this patient, including applicable screening as appropriate for fall prevention, nutrition, physical activity, Tobacco-use cessation, weight loss and cognition.  Checklist reviewing preventive services available has been given to the patient.  Reviewed patient's diet, addressing concerns and/or questions.   She is at risk for lack of exercise and has been provided with information to increase physical activity for the benefit of her well-being.   The patient was instructed to see the dentist every 6 months.   She is at risk for psychosocial distress and has been provided with information to reduce risk.           Subjective   Khadra is a 48 year old, presenting for the following:  Physical (Fasting )        5/1/2024     9:31 AM   Additional Questions   Roomed by Gen DOMINIQUE CMA   Accompanied by spouse        Health Care Directive  Patient does not have a Health Care Directive or Living Will: not discussed today    HPI  She does note that she has had some pain in her left elbow. Did start using a tennis elbow brace and that did help. She does limit some lifting that she does and that does help as well.     She does have a rash back again on her legs. She is using betamethasone and that doesn't seem to be helping. She did have testing and was told that she was allergic t something in grass and pollen. She does note that when she is not covered from the sun shine it does happen.     She does snore at times. She does feel that she gets dry in her throat as well. She does " seems to choke at night as well at times and then will wake up and start coughing. She does not think that she would tolerate a cpap.       5/1/2024   General Health   How would you rate your overall physical health? Excellent   Feel stress (tense, anxious, or unable to sleep) Only a little   (!) STRESS CONCERN      5/1/2024   Nutrition   Three or more servings of calcium each day? Yes   Diet: I don't know   How many servings of fruit and vegetables per day? (!) 2-3   How many sweetened beverages each day? 0-1         5/1/2024   Exercise   Days per week of moderate/strenous exercise 1 day   Average minutes spent exercising at this level 10 min   (!) EXERCISE CONCERN      5/1/2024   Social Factors   Frequency of gathering with friends or relatives Once a week   Worry food won't last until get money to buy more No   Food not last or not have enough money for food? No   Do you have housing?  Yes   Are you worried about losing your housing? No   Lack of transportation? No   Unable to get utilities (heat,electricity)? No         5/1/2024   Dental   Dentist two times every year? (!) NO         5/1/2024   TB Screening   Were you born outside of the [place]? Yes         Today's PHQ-2 Score:       5/1/2024     9:22 AM   PHQ-2 ( 1999 Pfizer)   Q1: Little interest or pleasure in doing things 1   Q2: Feeling down, depressed or hopeless 1   PHQ-2 Score 2   Q1: Little interest or pleasure in doing things Several days   Q2: Feeling down, depressed or hopeless Several days   PHQ-2 Score 2           5/1/2024   Substance Use   Alcohol more than 3/day or more than 7/wk No   Do you use any other substances recreationally? No     Social History     Tobacco Use    Smoking status: Never     Passive exposure: Never    Smokeless tobacco: Never   Vaping Use    Vaping status: Never Used   Substance Use Topics    Alcohol use: No    Drug use: No             5/1/2024   Breast Cancer Screening   Family history of breast, colon, or ovarian cancer? No /  "Unknown         1/31/2024   LAST FHS-7 RESULTS   1st degree relative breast or ovarian cancer No   Any relative bilateral breast cancer No   Any male have breast cancer No   Any ONE woman have BOTH breast AND ovarian cancer No   Any woman with breast cancer before 50yrs No   2 or more relatives with breast AND/OR ovarian cancer No   2 or more relatives with breast AND/OR bowel cancer No        Mammogram Screening - Mammogram every 1-2 years updated in Health Maintenance based on mutual decision making        5/1/2024   STI Screening   New sexual partner(s) since last STI/HIV test? No     History of abnormal Pap smear: NO - age 30-65 PAP every 5 years with negative HPV co-testing recommended        Latest Ref Rng & Units 4/19/2019     9:41 AM   PAP / HPV   PAP Negative for squamous intraepithelial lesion or malignancy. Negative for squamous intraepithelial lesion or malignancy  Electronically signed by Shea Pulido CT (ASCP) on 4/26/2019 at 12:51 PM      HPV 16 DNA NEG Negative    HPV 18 DNA NEG Negative    Other HR HPV NEG Negative      ASCVD Risk   The 10-year ASCVD risk score (Lilly KWAN, et al., 2019) is: 0.4%    Values used to calculate the score:      Age: 48 years      Sex: Female      Is Non- : No      Diabetic: No      Tobacco smoker: No      Systolic Blood Pressure: 100 mmHg      Is BP treated: No      HDL Cholesterol: 63 mg/dL      Total Cholesterol: 202 mg/dL        5/1/2024   Contraception/Family Planning   Questions about contraception or family planning No        Reviewed and updated as needed this visit by Provider   Tobacco  Allergies  Meds  Problems  Med Hx  Surg Hx  Fam Hx                   Objective    Exam  /64 (BP Location: Right arm, Patient Position: Sitting, Cuff Size: Adult Regular)   Pulse 80   Temp 98.1  F (36.7  C) (Oral)   Resp 16   Ht 1.632 m (5' 4.25\")   Wt 69.9 kg (154 lb)   LMP 04/22/2024   SpO2 98%   Breastfeeding No   BMI " "26.23 kg/m     Estimated body mass index is 26.23 kg/m  as calculated from the following:    Height as of this encounter: 1.632 m (5' 4.25\").    Weight as of this encounter: 69.9 kg (154 lb).    Physical Exam  GENERAL: alert and no distress  EYES: Eyes grossly normal to inspection, PERRL and conjunctivae and sclerae normal  HENT: ear canals and TM's normal, nose and mouth without ulcers or lesions  NECK: no adenopathy, no asymmetry, masses, or scars  RESP: lungs clear to auscultation - no rales, rhonchi or wheezes  CV: regular rate and rhythm, normal S1 S2, no S3 or S4, no murmur, click or rub, no peripheral edema  ABDOMEN: soft, nontender, no hepatosplenomegaly, no masses and bowel sounds normal   (female): normal female external genitalia, normal urethral meatus, normal vaginal mucosa, cervix is unremarkable.  MS: no gross musculoskeletal defects noted, no edema  MS: Left arm with pain upon resisted extension of the wrist over the lateral epicondyles  SKIN: no suspicious lesions, bilateral shins show erythema with confluent plaques, no obvious satellite lesions  NEURO: Normal strength and tone, mentation intact and speech normal  PSYCH: mentation appears normal, affect normal/bright        Signed Electronically by: Verona Mcgregor MD    "

## 2024-05-01 NOTE — PATIENT INSTRUCTIONS
I think that your skin issues are partially related to polymorphous light reaction. I'd have you try doing a mineral based sunscreen.     I like Blue Lizard Sensitive sunscreen.

## 2024-05-02 LAB
CHOLEST SERPL-MCNC: 225 MG/DL
FASTING STATUS PATIENT QL REPORTED: YES
HDLC SERPL-MCNC: 73 MG/DL
LDLC SERPL CALC-MCNC: 135 MG/DL
NONHDLC SERPL-MCNC: 152 MG/DL
TRIGL SERPL-MCNC: 85 MG/DL
TSH SERPL DL<=0.005 MIU/L-ACNC: 0.96 UIU/ML (ref 0.3–4.2)
VIT B12 SERPL-MCNC: 290 PG/ML (ref 232–1245)
VIT D+METAB SERPL-MCNC: 49 NG/ML (ref 20–50)

## 2024-05-04 LAB
BKR LAB AP GYN ADEQUACY: NORMAL
BKR LAB AP GYN INTERPRETATION: NORMAL
BKR LAB AP HPV REFLEX: NORMAL
BKR LAB AP PREVIOUS ABNORMAL: NORMAL
PATH REPORT.COMMENTS IMP SPEC: NORMAL
PATH REPORT.COMMENTS IMP SPEC: NORMAL
PATH REPORT.RELEVANT HX SPEC: NORMAL

## 2024-05-08 LAB
HUMAN PAPILLOMA VIRUS 16 DNA: NEGATIVE
HUMAN PAPILLOMA VIRUS 18 DNA: NEGATIVE
HUMAN PAPILLOMA VIRUS FINAL DIAGNOSIS: NORMAL
HUMAN PAPILLOMA VIRUS OTHER HR: NEGATIVE

## 2024-07-25 DIAGNOSIS — E55.9 VITAMIN D DEFICIENCY: ICD-10-CM

## 2024-07-25 RX ORDER — ERGOCALCIFEROL 1.25 MG/1
50000 CAPSULE, LIQUID FILLED ORAL WEEKLY
Qty: 12 CAPSULE | Refills: 0 | Status: CANCELLED | OUTPATIENT
Start: 2024-07-25

## 2024-07-25 NOTE — TELEPHONE ENCOUNTER
Spoke with patient to relay provider message. She verbalized understanding and will  the recommended units OTC.    Jameson Ventura RN  [facility]

## 2024-07-25 NOTE — TELEPHONE ENCOUNTER
Pending Prescriptions:                       Disp   Refills    vitamin D2 (ERGOCALCIFEROL) 01755 units (*12 cap*0            Sig: Take 1 capsule (50,000 Units) by mouth once a           week

## 2024-07-25 NOTE — TELEPHONE ENCOUNTER
Generally we do well with doing about 6866-4333 international unit(s) daily which is 25-50 mcg. That's what I would recommend.

## 2024-07-25 NOTE — TELEPHONE ENCOUNTER
S-(situation): Patient is wondering if she should still be taking vitamin D.     B-(background): Labs completed on 5/1/24 and vitamin D was within normal range.     A-(assessment): Patient is wondering if she should continue taking vitamin D as last labs were within normal range. If she should continue taking, she is wondering if she should take 50,000 units weekly or a lower dose. States she has been alternating the lower dose and the 50,000 unit dose due to side effects from the higher dose- states she discussed this with PCP previously.     R-(recommendations): Please advise if patient should continue to take vitamin D and if so, what dose.     Routing to provider to review and advise.     Henrietta Reinoso RN  [facility]

## 2024-08-08 ENCOUNTER — ALLIED HEALTH/NURSE VISIT (OUTPATIENT)
Dept: FAMILY MEDICINE | Facility: CLINIC | Age: 49
End: 2024-08-08

## 2024-08-08 DIAGNOSIS — Z23 IMMUNIZATION DUE: ICD-10-CM

## 2024-08-08 PROCEDURE — 90471 IMMUNIZATION ADMIN: CPT

## 2024-08-08 PROCEDURE — 99207 PR NO CHARGE NURSE ONLY: CPT

## 2024-08-08 PROCEDURE — 90746 HEPB VACCINE 3 DOSE ADULT IM: CPT

## 2024-08-08 NOTE — PROGRESS NOTES
Prior to immunization administration, verified patients identity using patient s name and date of birth. Please see Immunization Activity for additional information.     Screening Questionnaire for Adult Immunization    Are you sick today?   No   Do you have allergies to medications, food, a vaccine component or latex?   Don't Know   Have you ever had a serious reaction after receiving a vaccination?   No   Do you have a long-term health problem with heart, lung, kidney, or metabolic disease (e.g., diabetes), asthma, a blood disorder, no spleen, complement component deficiency, a cochlear implant, or a spinal fluid leak?  Are you on long-term aspirin therapy?   No   Do you have cancer, leukemia, HIV/AIDS, or any other immune system problem?   No   Do you have a parent, brother, or sister with an immune system problem?   No   In the past 3 months, have you taken medications that affect  your immune system, such as prednisone, other steroids, or anticancer drugs; drugs for the treatment of rheumatoid arthritis, Crohn s disease, or psoriasis; or have you had radiation treatments?   No   Have you had a seizure, or a brain or other nervous system problem?   No   During the past year, have you received a transfusion of blood or blood    products, or been given immune (gamma) globulin or antiviral drug?   No   For women: Are you pregnant or is there a chance you could become       pregnant during the next month?   No   Have you received any vaccinations in the past 4 weeks?   No     Immunization questionnaire answers were all negative.    I have reviewed the following standing orders:   This patient is due and qualifies for the Hepatitis B vaccine.    Click here for Hepatitis B Standing Order    I have reviewed the vaccines inclusion and exclusion criteria; No concerns regarding eligibility.     Patient instructed to remain in clinic for 15 minutes afterwards, and to report any adverse reactions.     Screening performed by  Vijaya Emery, ERNESTO on 8/8/2024 at 8:31 AM.

## 2024-10-25 ENCOUNTER — TELEPHONE (OUTPATIENT)
Dept: FAMILY MEDICINE | Facility: CLINIC | Age: 49
End: 2024-10-25

## 2024-10-25 NOTE — TELEPHONE ENCOUNTER
Pt called back, she did not know this was scheduled and if she does not need it, ok to cancel. Writer cancelled appt

## 2024-10-25 NOTE — TELEPHONE ENCOUNTER
Per Dr Mcgregor , pt has completed her series of Hepatitis B. She doesn't need to keep her appt 11/1/24 on the [facility] schedule for additional Hep B vaccine.    Left message for patient to call back.

## 2025-04-01 ENCOUNTER — PATIENT OUTREACH (OUTPATIENT)
Dept: CARE COORDINATION | Facility: CLINIC | Age: 50
End: 2025-04-01

## 2025-04-15 ENCOUNTER — PATIENT OUTREACH (OUTPATIENT)
Dept: CARE COORDINATION | Facility: CLINIC | Age: 50
End: 2025-04-15

## 2025-06-07 ENCOUNTER — HEALTH MAINTENANCE LETTER (OUTPATIENT)
Age: 50
End: 2025-06-07